# Patient Record
Sex: FEMALE | Race: WHITE | Employment: OTHER | URBAN - METROPOLITAN AREA
[De-identification: names, ages, dates, MRNs, and addresses within clinical notes are randomized per-mention and may not be internally consistent; named-entity substitution may affect disease eponyms.]

---

## 2019-05-07 ENCOUNTER — HOSPITAL ENCOUNTER (OUTPATIENT)
Dept: ULTRASOUND IMAGING | Age: 69
Discharge: HOME | End: 2019-05-07
Attending: PHYSICIAN ASSISTANT
Payer: MEDICARE

## 2019-05-07 DIAGNOSIS — R10.13 EPIGASTRIC PAIN: ICD-10-CM

## 2019-05-07 DIAGNOSIS — R11.0 NAUSEA: ICD-10-CM

## 2019-05-07 DIAGNOSIS — R10.11 RUQ PAIN: ICD-10-CM

## 2019-05-07 PROCEDURE — 76705 ECHO EXAM OF ABDOMEN: CPT

## 2019-07-03 ENCOUNTER — HOSPITAL ENCOUNTER (OUTPATIENT)
Dept: SURGERY | Age: 69
Discharge: HOME | End: 2019-07-03

## 2019-07-08 VITALS — WEIGHT: 136 LBS | BODY MASS INDEX: 25.03 KG/M2 | HEIGHT: 62 IN

## 2019-07-08 NOTE — PERIOP NOTES
Patient verified name, , and procedure. Type: 1a; abbreviated assessment per anesthesia guidelines  Labs per surgeon: none  Labs per anesthesia: none    Instructed pt that they will be notified via office/GI LAB for time of arrival to GI lab. Follow diet and prep instructions per office including NPO status. If patient has NOT received instructions from office patient is advised to call surgeon office, verbalizes understanding. Bath or shower the night before and the am of surgery with non-mositurizing soap. No lotions, oils, powders, cologne on skin. No make up, eye make up or jewelry. Wear loose fitting comfortable, clean clothing. Must have adult present in building the entire time . Medications for the day of procedure- prilosec, zantac, patient to hold- vitamins. The following discharge instructions reviewed with patient: medication given during procedure may cause drowsiness for several hours, therefore, do not drive or operate machinery for remainder of the day. You may not drink alcohol on the day of your procedure, please resume regular diet and activity unless otherwise directed. You may experience abdominal distention for several hours that is relieved by the passage of gas. Contact your physician if you have any of the following: fever or chills, severe abdominal pain or excessive amount of bleeding or a large amount when having a bowel movement.  Occasional specks of blood with bowel movement would not be unusual.

## 2019-07-09 ENCOUNTER — ANESTHESIA EVENT (OUTPATIENT)
Dept: ENDOSCOPY | Age: 69
End: 2019-07-09
Payer: MEDICARE

## 2019-07-10 ENCOUNTER — HOSPITAL ENCOUNTER (OUTPATIENT)
Age: 69
Setting detail: OUTPATIENT SURGERY
Discharge: HOME | End: 2019-07-10
Attending: SURGERY | Admitting: SURGERY
Payer: MEDICARE

## 2019-07-10 ENCOUNTER — ANESTHESIA (OUTPATIENT)
Dept: ENDOSCOPY | Age: 69
End: 2019-07-10
Payer: MEDICARE

## 2019-07-10 VITALS
TEMPERATURE: 96.8 F | BODY MASS INDEX: 23.52 KG/M2 | HEIGHT: 62 IN | DIASTOLIC BLOOD PRESSURE: 62 MMHG | HEART RATE: 63 BPM | WEIGHT: 127.8 LBS | SYSTOLIC BLOOD PRESSURE: 114 MMHG | RESPIRATION RATE: 18 BRPM | OXYGEN SATURATION: 96 %

## 2019-07-10 PROCEDURE — 88312 SPECIAL STAINS GROUP 1: CPT

## 2019-07-10 PROCEDURE — 76060000032 HC ANESTHESIA 0.5 TO 1 HR: Performed by: SURGERY

## 2019-07-10 PROCEDURE — 74011250636 HC RX REV CODE- 250/636

## 2019-07-10 PROCEDURE — 88305 TISSUE EXAM BY PATHOLOGIST: CPT

## 2019-07-10 PROCEDURE — 76040000025: Performed by: SURGERY

## 2019-07-10 PROCEDURE — 77030009426 HC FCPS BIOP ENDOSC BSC -B: Performed by: SURGERY

## 2019-07-10 PROCEDURE — 74011250636 HC RX REV CODE- 250/636: Performed by: ANESTHESIOLOGY

## 2019-07-10 RX ORDER — LIDOCAINE HYDROCHLORIDE 20 MG/ML
INJECTION, SOLUTION EPIDURAL; INFILTRATION; INTRACAUDAL; PERINEURAL AS NEEDED
Status: DISCONTINUED | OUTPATIENT
Start: 2019-07-10 | End: 2019-07-10 | Stop reason: HOSPADM

## 2019-07-10 RX ORDER — PROPOFOL 10 MG/ML
INJECTION, EMULSION INTRAVENOUS AS NEEDED
Status: DISCONTINUED | OUTPATIENT
Start: 2019-07-10 | End: 2019-07-10 | Stop reason: HOSPADM

## 2019-07-10 RX ORDER — PROPOFOL 10 MG/ML
INJECTION, EMULSION INTRAVENOUS
Status: DISCONTINUED | OUTPATIENT
Start: 2019-07-10 | End: 2019-07-10 | Stop reason: HOSPADM

## 2019-07-10 RX ORDER — SUCRALFATE 1 G/1
1 TABLET ORAL 4 TIMES DAILY
Qty: 120 TAB | Refills: 3 | Status: SHIPPED | OUTPATIENT
Start: 2019-07-10 | End: 2019-07-10 | Stop reason: SDUPTHER

## 2019-07-10 RX ORDER — SODIUM CHLORIDE, SODIUM LACTATE, POTASSIUM CHLORIDE, CALCIUM CHLORIDE 600; 310; 30; 20 MG/100ML; MG/100ML; MG/100ML; MG/100ML
100 INJECTION, SOLUTION INTRAVENOUS CONTINUOUS
Status: DISCONTINUED | OUTPATIENT
Start: 2019-07-10 | End: 2019-07-10 | Stop reason: HOSPADM

## 2019-07-10 RX ADMIN — PROPOFOL 50 MG: 10 INJECTION, EMULSION INTRAVENOUS at 10:11

## 2019-07-10 RX ADMIN — SODIUM CHLORIDE, SODIUM LACTATE, POTASSIUM CHLORIDE, AND CALCIUM CHLORIDE 100 ML/HR: 600; 310; 30; 20 INJECTION, SOLUTION INTRAVENOUS at 09:50

## 2019-07-10 RX ADMIN — PROPOFOL 300 MCG/KG/MIN: 10 INJECTION, EMULSION INTRAVENOUS at 10:11

## 2019-07-10 RX ADMIN — LIDOCAINE HYDROCHLORIDE 40 MG: 20 INJECTION, SOLUTION EPIDURAL; INFILTRATION; INTRACAUDAL; PERINEURAL at 10:11

## 2019-07-10 NOTE — ANESTHESIA PREPROCEDURE EVALUATION
Relevant Problems   No relevant active problems       Anesthetic History   No history of anesthetic complications            Review of Systems / Medical History  Patient summary reviewed and pertinent labs reviewed    Pulmonary          Smoker         Neuro/Psych   Within defined limits           Cardiovascular  Within defined limits                Exercise tolerance: >4 METS     GI/Hepatic/Renal     GERD: well controlled           Endo/Other  Within defined limits           Other Findings              Physical Exam    Airway             Cardiovascular  Regular rate and rhythm,  S1 and S2 normal,  no murmur, click, rub, or gallop             Dental         Pulmonary  Breath sounds clear to auscultation               Abdominal         Other Findings            Anesthetic Plan    ASA: 2  Anesthesia type: total IV anesthesia          Induction: Intravenous  Anesthetic plan and risks discussed with: Patient

## 2019-07-10 NOTE — DISCHARGE INSTRUCTIONS
Jeimy Castillo M.D.  [phone]    Instructions following colonoscopy:    ACTIVITY:   Resume usual, basic activities around the house today.  You may be light-headed or sleepy from anesthesia, so be careful going up and down stairs.  Avoid driving, operating machinery, or signing documents for 24 hours. DIET:   No restriction. Please note, some people may have nausea or cramps after this procedure which can result in an upset stomach after eating.  Many people have loose stools or diarrhea immediately after colonoscopy. It is also not uncommon to not have a bowel movement for 2-3 days. PAIN:   Some cramping or gas pain is normal after colonoscopy. However, if you experience worsening pain over the course of the day, or pain with associated fever please call the office immediately      [id number] IF:   You have a temperature higher than 101.5° Fahrenheit for more than 6 hours.  You have severe nausea or vomiting not relieved by medication; or diarrhea. If you take a blood thinning medicine resume it:    Otherwise, continue home medications as previously prescribed. Gastrointestinal Esophagogastroduodenoscopy (EGD) - Upper Exam Discharge Instructions    1. Call Dr. Bobby Prasad at office for any problems or questions. 2. Contact the doctor's office for follow up appointment as directed. 3. Medication may cause drowsiness for several hours, therefore:  · Do not drive or operate machinery for remainder of the day. · No alcohol today. · Do not make any important or legal decisions for 24 hours. · Do not sign any legal documents for 24 hours. 5. Ordinarily, you may resume regular diet and activity after exam unless otherwise specified by your physician. 6. For mild soreness in your throat you may use Cepacol throat lozenges or warm salt-water gargles as needed.     Any additional instructions:  ***     Instructions given to Kareen Greer and other family members.

## 2019-07-10 NOTE — PROCEDURES
Esophagogastroduodenoscopy Procedure Note      Patient: Lashanda Schafer MRN: [id number]     YOB: 1950  Age: 68 y.o. Sex: female           Procedure in Detail:  Informed consent was obtained for the procedure, the patient was brought to the endoscopy suite and sedation was induced by anesthesia. The SPRH214 gastroscope was inserted into the mouth and advanced under direct vision to second portion of the duodenum. A careful inspection was made as the gastroscope was withdrawn, including a retroflexed view of the proximal stomach; findings and interventions are described below, with appropriate photodocumentation obtained. Findings:   Oropharynx:  Normal  Esophagus:  Normal     - nominally irregular EG mucosal transition. However, very large hiatal hernia noted with Z-line approximately 15cm above apparent diagphragmatic hiatus  Cardia of the stomach:  Normal  Body of the stomach:    - Flat lesions:     - mild friability, with mild to moderate biliary  gastritis  Fundus of the stomach:    - Flat lesions:     - mild gastritis  Antrum of the stomach:    - Flat lesions:     - moderate gastritis  Duodenum:  Normal    Therapies:    biopsy of stomach pre pyloric antrum    EBL-  minimal    Specimens: Specimens were collected and sent to pathology. Complications:   None; patient tolerated the procedure well. Recommendations:  - Continue acid suppression.  - Await pathology.   - trial of carafate, consider questran.     -no plan for cholecystectomy at this time    Signed by: Joaquín Ponce MD                         July 10, 2019

## 2019-07-10 NOTE — H&P
New Patient         Patient is a 68 y.o. female who presents for consideration of cholecystectomy. She reports symptoms began in February when she had pain from a Friday night at 10pm to about Saturday at 5pm.  It took a full 3-4 days for her to begin to feel well from the nausea. She attributed that episode to a GI bug as she had been around people with typical viral symptoms. In April she began having episodes of feeling \"gassy\" in her chest with associated RUQ pain. She recalls one particularly severe episode while at the beach which occurred shortly after eating fried shrimp and crabcakes. This was not associated with nausea,vomiting, diarrhea, bloating or fever. However, since that episode she has been bothered at least daily with discomfort. She does not note any specific food triggers. She did not get significant relief from pepcid OTC or zantac OTC. She began to restrict her diet to bland foods such as yogurt, potatoes, and grits. She finally presented to her PCP and underwent testing but also was started on prilosec. She reports that within 2 days she was completely better. She had not had any pain in the nearly 2 weeks between then and her office visit, and has had only occasional abdominal pains since then and today.     Also for initial colonoscopy. Had abdominal pain, severe, epigastric, after drinking mag citrate given due to ineffective miralax.     Medications:        Current Outpatient Medications   Medication Sig    Biotin 2,500 mcg cap Take  by mouth. Indications: patient takes med every other day    raNITIdine (ZANTAC) 150 mg tablet Take 150 mg by mouth daily. Indications: heartburn    omeprazole (PRILOSEC) 20 mg capsule 1 capsule every morning, 30 minutes prior to breakfast      No current facility-administered medications for this visit.          Allergies:          Allergies   Allergen Reactions    Codeine Other (comments)       Stomach ache    Doxycycline Other (comments)       Stomach aches    Penicillins Rash         Past History:  History reviewed. No pertinent past medical history. History reviewed. No pertinent surgical history.      Family and Social History:        Family History   Problem Relation Age of Onset   [place] Cancer Mother      COPD Mother      No Known Problems Father        Social History            Socioeconomic History    Marital status:        Spouse name: Not on file    Number of children: Not on file    Years of education: Not on file    Highest education level: Not on file   Occupational History    Not on file   Social Needs    Financial resource strain: Not on file    Food insecurity:       Worry: Not on file       Inability: Not on file    Transportation needs:       Medical: Not on file       Non-medical: Not on file   Tobacco Use    Smoking status: Current Every Day Smoker       Packs/day: 0.50    Smokeless tobacco: Never Used   Substance and Sexual Activity    Alcohol use: Yes       Comment: occ    Drug use: Never    Sexual activity: Not Currently   Lifestyle    Physical activity:       Days per week: Not on file       Minutes per session: Not on file    Stress: Not on file   Relationships    Social connections:       Talks on phone: Not on file       Gets together: Not on file       Attends Restorationism service: Not on file       Active member of club or organization: Not on file       Attends meetings of clubs or organizations: Not on file       Relationship status: Not on file    Intimate partner violence:       Fear of current or ex partner: Not on file       Emotionally abused: Not on file       Physically abused: Not on file       Forced sexual activity: Not on file   Other Topics Concern    Not on file   Social History Narrative    Not on file       Review of Systems   Constitutional: Negative. HENT: Positive for hearing loss. Eyes: Negative. Respiratory: Negative. Cardiovascular: Negative.     Gastrointestinal: Negative. Genitourinary: Negative. Musculoskeletal: Negative. Skin: Negative. Neurological: Negative. Endo/Heme/Allergies: Bruises/bleeds easily. Psychiatric/Behavioral: Negative.          Physical Exam   Constitutional: She is oriented to person, place, and time. She appears well-developed and well-nourished. She is cooperative. Non-toxic appearance. HENT:   Head: Normocephalic and atraumatic. Mouth/Throat: Oropharynx is clear and moist.   Eyes: Pupils are equal, round, and reactive to light. Conjunctivae and EOM are normal. No scleral icterus. Neck: Normal range of motion. No JVD present. No tracheal deviation present. No thyromegaly present. Cardiovascular: Normal rate, regular rhythm and normal heart sounds. Exam reveals no gallop and no friction rub. No murmur heard. Pulmonary/Chest: Effort normal and breath sounds normal. No respiratory distress. She has no wheezes. She has no rales. Abdominal: Soft. Bowel sounds are normal. She exhibits no distension. There is no tenderness. There is no rebound and no guarding. Musculoskeletal:   No gross deformities   Neurological: She is alert and oriented to person, place, and time. No cranial nerve deficit. Skin: Skin is warm. She is not diaphoretic. Psychiatric: She has a normal mood and affect. Her behavior is normal. Judgment and thought content normal.   Vitals reviewed.     DATA:  US Results (most recent):  Results from [place] encounter on 05/07/19   US ABD LTD     Narrative ABDOMINAL ULTRASOUND     HISTORY: Right upper quadrant pain and epigastric pain for 2 months.     TECHNIQUE: Sonographic imaging of the right upper quadrant was performed.     COMPARISON: None     FINDINGS: The pancreatic head is grossly normal in appearance.     The liver parenchyma is homogeneous in echotexture without focal masses or  intrahepatic biliary ductal dilatation. Hepatopedal flow is present in the main  portal vein.  The common bile duct is 6 mm in diameter at the hilum of the liver  and distended up to 2 cm more distally where it appears to contain an echogenic  shadowing stone. Shadowing intraluminal gallstones are present. There is no  pericholecystic fluid. The gallbladder wall is mildly thickened up to 3.4 mm. The sonographic Lanier's sign is absent.     The right kidney is 10.4 cm in length. There is no hydronephrosis. There is 1.2  cm upper pole simple cyst in the right kidney.     The distal abdominal aorta is distended up to 1.9 cm in diameter. The IVC is  patent.        Impression IMPRESSION: Cholelithiasis with probable choledocholithiasis. Mild gallbladder  wall thickening. Consider surgical evaluation and if indicated a nuclear  medicine hepatobiliary scan for further assessment.        Results were phoned by the radiology navigator to the referring physician's  office. [id number]                  Lab Results   Component Value Date/Time     ALT (SGPT) 12 04/30/2019 11:10 AM     AST (SGOT) 15 04/30/2019 11:10 AM     Alk. phosphatase 149 (H) 04/30/2019 11:10 AM     Bilirubin, total 0.4 04/30/2019 11:10 AM      ASSESSMENT and PLAN       Encounter Diagnoses   Name Primary?  Gallstones Yes    Upper abdominal pain      Family history of colon cancer requiring screening colonoscopy        Uncertain based on her history if symptoms are gallstone related, which is more likely from her description, or gastritis related based on her apparent symptom resolution with PPI initiation. She has not had significant symptoms since office visit 5/6, but does have occasional epigastric and right mid/lower abdominal twinges after eating. Will proceed with EGD and Colonoscopy. Suspect symptoms likely gallbladder in origin, need to r/o smoldering gastritis.

## 2019-07-10 NOTE — PROCEDURES
Procedure in Detail:  Informed consent was obtained for the procedure. The patient was placed in the left lateral decubitus position and sedation was induced by anesthesia. The scope was inserted into the rectum and advanced under direct vision to the terminal ileum. The quality of the colonic preparation was good. A careful inspection was made as the colonoscope was withdrawn, including a retroflexed view of the rectum; findings and interventions are described below. Appropriate photodocumentation was obtained. Findings:   ANUS: Anal exam reveals no masses or hemorrhoids, sphincter tone is normal.   RECTUM: Rectal exam reveals no masses or hemorrhoids. SIGMOID COLON: The mucosa is normal with good vascular pattern and without ulcers, or polyps. Mild to moderate diverticulosis noted. DESCENDING COLON: The mucosa is normal with good vascular pattern and without ulcers, diverticula, and polyps. SPLENIC FLEXURE: The splenic flexure is normal.   TRANSVERSE COLON: The mucosa is normal with good vascular pattern and without ulcers, diverticula, and polyps. HEPATIC FLEXURE: The hepatic flexure is normal.   ASCENDING COLON: The mucosa is normal with good vascular pattern and without ulcers, diverticula, and polyps. CECUM: The appendiceal orifice appears normal. The ileocecal valve appears normal.   TERMINAL ILEUM: The terminal ileum was normal.     Specimens: No specimens were collected. Complications: None; patient tolerated the procedure well. \    EBL - none    Recommendations:   - For colon cancer screening in this average-risk patient, colonoscopy may be repeated in 10 years.      Signed By: Elijah Vivas MD                        July 10, 2019

## 2019-07-10 NOTE — ANESTHESIA POSTPROCEDURE EVALUATION
Procedure(s):  COLONOSCOPY  ESOPHAGOGASTRODUODENOSCOPY (EGD)/ BMI 23  ESOPHAGOGASTRODUODENAL (EGD) BIOPSY.     total IV anesthesia    Anesthesia Post Evaluation      Multimodal analgesia: multimodal analgesia used between 6 hours prior to anesthesia start to PACU discharge  Patient location during evaluation: bedside  Patient participation: complete - patient participated  Level of consciousness: awake and alert  Pain score: 3  Pain management: adequate  Airway patency: patent  Anesthetic complications: no  Cardiovascular status: acceptable and hemodynamically stable  Respiratory status: acceptable  Hydration status: acceptable  Post anesthesia nausea and vomiting:  none      Vitals Value Taken Time   BP 75/51 7/10/2019 10:44 AM   Temp 36 °C (96.8 °F) 7/10/2019 10:44 AM   Pulse 75 7/10/2019 10:44 AM   Resp 16 7/10/2019 10:44 AM   SpO2 88 % 7/10/2019 10:44 AM

## 2019-07-18 NOTE — PROGRESS NOTES
Patient informed of results. Patient reports constipation and itching since beginning carafate. Will review with Isabelle Aquino on Tuesday and get back with patient.

## 2019-08-05 ENCOUNTER — APPOINTMENT (OUTPATIENT)
Dept: ULTRASOUND IMAGING | Age: 69
DRG: 446 | End: 2019-08-05
Attending: NURSE PRACTITIONER
Payer: MEDICARE

## 2019-08-05 ENCOUNTER — HOSPITAL ENCOUNTER (EMERGENCY)
Age: 69
Discharge: HOME | DRG: 446 | End: 2019-08-05
Attending: EMERGENCY MEDICINE
Payer: MEDICARE

## 2019-08-05 ENCOUNTER — ANESTHESIA EVENT (OUTPATIENT)
Dept: ENDOSCOPY | Age: 69
DRG: 446 | End: 2019-08-05
Payer: MEDICARE

## 2019-08-05 ENCOUNTER — ANESTHESIA (OUTPATIENT)
Dept: ENDOSCOPY | Age: 69
DRG: 446 | End: 2019-08-05
Payer: MEDICARE

## 2019-08-05 ENCOUNTER — HOSPITAL ENCOUNTER (INPATIENT)
Age: 69
LOS: 2 days | Discharge: HOME | DRG: 446 | End: 2019-08-07
Attending: INTERNAL MEDICINE | Admitting: INTERNAL MEDICINE
Payer: MEDICARE

## 2019-08-05 VITALS
OXYGEN SATURATION: 92 % | HEART RATE: 45 BPM | TEMPERATURE: 97.6 F | BODY MASS INDEX: 24.84 KG/M2 | HEIGHT: 62 IN | SYSTOLIC BLOOD PRESSURE: 163 MMHG | RESPIRATION RATE: 18 BRPM | DIASTOLIC BLOOD PRESSURE: 80 MMHG | WEIGHT: 135 LBS

## 2019-08-05 DIAGNOSIS — K80.50 GALL STONES, COMMON BILE DUCT: Primary | ICD-10-CM

## 2019-08-05 PROBLEM — K21.9 GERD (GASTROESOPHAGEAL REFLUX DISEASE): Status: ACTIVE | Noted: 2019-08-05

## 2019-08-05 LAB
ALBUMIN SERPL-MCNC: 4.2 G/DL (ref 3.2–4.6)
ALBUMIN/GLOB SERPL: 1 {RATIO} (ref 1.2–3.5)
ALP SERPL-CCNC: 575 U/L (ref 50–130)
ALT SERPL-CCNC: 574 U/L (ref 12–65)
ANION GAP SERPL CALC-SCNC: 10 MMOL/L (ref 7–16)
AST SERPL-CCNC: 297 U/L (ref 15–37)
BASOPHILS # BLD: 0 K/UL (ref 0–0.2)
BASOPHILS NFR BLD: 0 % (ref 0–2)
BILIRUB SERPL-MCNC: 7.7 MG/DL (ref 0.2–1.1)
BUN SERPL-MCNC: 14 MG/DL (ref 8–23)
CALCIUM SERPL-MCNC: 10.3 MG/DL (ref 8.3–10.4)
CHLORIDE SERPL-SCNC: 105 MMOL/L (ref 98–107)
CO2 SERPL-SCNC: 27 MMOL/L (ref 21–32)
CREAT SERPL-MCNC: 0.92 MG/DL (ref 0.6–1)
DIFFERENTIAL METHOD BLD: ABNORMAL
EOSINOPHIL # BLD: 0 K/UL (ref 0–0.8)
EOSINOPHIL NFR BLD: 0 % (ref 0.5–7.8)
ERYTHROCYTE [DISTWIDTH] IN BLOOD BY AUTOMATED COUNT: 14.1 % (ref 11.9–14.6)
GLOBULIN SER CALC-MCNC: 4.2 G/DL (ref 2.3–3.5)
GLUCOSE SERPL-MCNC: 133 MG/DL (ref 65–100)
HCT VFR BLD AUTO: 49.5 % (ref 35.8–46.3)
HGB BLD-MCNC: 16.5 G/DL (ref 11.7–15.4)
IMM GRANULOCYTES # BLD AUTO: 0.1 K/UL (ref 0–0.5)
IMM GRANULOCYTES NFR BLD AUTO: 0 % (ref 0–5)
LIPASE SERPL-CCNC: 224 U/L (ref 73–393)
LYMPHOCYTES # BLD: 0.9 K/UL (ref 0.5–4.6)
LYMPHOCYTES NFR BLD: 7 % (ref 13–44)
MCH RBC QN AUTO: 28.4 PG (ref 26.1–32.9)
MCHC RBC AUTO-ENTMCNC: 33.3 G/DL (ref 31.4–35)
MCV RBC AUTO: 85.2 FL (ref 79.6–97.8)
MONOCYTES # BLD: 1 K/UL (ref 0.1–1.3)
MONOCYTES NFR BLD: 8 % (ref 4–12)
NEUTS SEG # BLD: 10.7 K/UL (ref 1.7–8.2)
NEUTS SEG NFR BLD: 85 % (ref 43–78)
NRBC # BLD: 0 K/UL (ref 0–0.2)
PLATELET # BLD AUTO: 395 K/UL (ref 150–450)
PMV BLD AUTO: 10.1 FL (ref 9.4–12.3)
POTASSIUM SERPL-SCNC: 3.8 MMOL/L (ref 3.5–5.1)
PROT SERPL-MCNC: 8.4 G/DL (ref 6.3–8.2)
RBC # BLD AUTO: 5.81 M/UL (ref 4.05–5.2)
SODIUM SERPL-SCNC: 142 MMOL/L (ref 136–145)
TROPONIN I SERPL-MCNC: <0.02 NG/ML (ref 0.02–0.05)
WBC # BLD AUTO: 12.7 K/UL (ref 4.3–11.1)

## 2019-08-05 PROCEDURE — 77030033269 HC SLV COMPR SCD KNE2 CARD -B

## 2019-08-05 PROCEDURE — 96376 TX/PRO/DX INJ SAME DRUG ADON: CPT | Performed by: NURSE PRACTITIONER

## 2019-08-05 PROCEDURE — 65270000029 HC RM PRIVATE

## 2019-08-05 PROCEDURE — 96365 THER/PROPH/DIAG IV INF INIT: CPT | Performed by: NURSE PRACTITIONER

## 2019-08-05 PROCEDURE — 83690 ASSAY OF LIPASE: CPT

## 2019-08-05 PROCEDURE — 74011000258 HC RX REV CODE- 258: Performed by: NURSE PRACTITIONER

## 2019-08-05 PROCEDURE — 96375 TX/PRO/DX INJ NEW DRUG ADDON: CPT | Performed by: NURSE PRACTITIONER

## 2019-08-05 PROCEDURE — 84484 ASSAY OF TROPONIN QUANT: CPT

## 2019-08-05 PROCEDURE — 77030020263 HC SOL INJ SOD CL0.9% LFCR 1000ML

## 2019-08-05 PROCEDURE — 74011250636 HC RX REV CODE- 250/636: Performed by: INTERNAL MEDICINE

## 2019-08-05 PROCEDURE — 74011250636 HC RX REV CODE- 250/636: Performed by: NURSE PRACTITIONER

## 2019-08-05 PROCEDURE — 99285 EMERGENCY DEPT VISIT HI MDM: CPT | Performed by: NURSE PRACTITIONER

## 2019-08-05 PROCEDURE — 93005 ELECTROCARDIOGRAM TRACING: CPT | Performed by: NURSE PRACTITIONER

## 2019-08-05 PROCEDURE — 80053 COMPREHEN METABOLIC PANEL: CPT

## 2019-08-05 PROCEDURE — 74011000258 HC RX REV CODE- 258: Performed by: INTERNAL MEDICINE

## 2019-08-05 PROCEDURE — 76705 ECHO EXAM OF ABDOMEN: CPT

## 2019-08-05 PROCEDURE — 85025 COMPLETE CBC W/AUTO DIFF WBC: CPT

## 2019-08-05 RX ORDER — METRONIDAZOLE 500 MG/100ML
500 INJECTION, SOLUTION INTRAVENOUS EVERY 8 HOURS
Status: DISCONTINUED | OUTPATIENT
Start: 2019-08-05 | End: 2019-08-05

## 2019-08-05 RX ORDER — ONDANSETRON 2 MG/ML
4 INJECTION INTRAMUSCULAR; INTRAVENOUS
Status: DISCONTINUED | OUTPATIENT
Start: 2019-08-05 | End: 2019-08-07 | Stop reason: HOSPADM

## 2019-08-05 RX ORDER — MORPHINE SULFATE 10 MG/ML
4 INJECTION, SOLUTION INTRAMUSCULAR; INTRAVENOUS
Status: COMPLETED | OUTPATIENT
Start: 2019-08-05 | End: 2019-08-05

## 2019-08-05 RX ORDER — MORPHINE SULFATE 10 MG/ML
4 INJECTION, SOLUTION INTRAMUSCULAR; INTRAVENOUS
Status: DISCONTINUED | OUTPATIENT
Start: 2019-08-05 | End: 2019-08-05

## 2019-08-05 RX ORDER — SODIUM CHLORIDE 9 MG/ML
125 INJECTION, SOLUTION INTRAVENOUS CONTINUOUS
Status: DISCONTINUED | OUTPATIENT
Start: 2019-08-05 | End: 2019-08-07 | Stop reason: HOSPADM

## 2019-08-05 RX ORDER — SODIUM CHLORIDE 9 MG/ML
125 INJECTION, SOLUTION INTRAVENOUS ONCE
Status: COMPLETED | OUTPATIENT
Start: 2019-08-05 | End: 2019-08-05

## 2019-08-05 RX ORDER — SODIUM CHLORIDE 0.9 % (FLUSH) 0.9 %
5-40 SYRINGE (ML) INJECTION AS NEEDED
Status: DISCONTINUED | OUTPATIENT
Start: 2019-08-05 | End: 2019-08-07 | Stop reason: HOSPADM

## 2019-08-05 RX ORDER — ONDANSETRON 2 MG/ML
4 INJECTION INTRAMUSCULAR; INTRAVENOUS
Status: COMPLETED | OUTPATIENT
Start: 2019-08-05 | End: 2019-08-05

## 2019-08-05 RX ORDER — SODIUM CHLORIDE 0.9 % (FLUSH) 0.9 %
5-40 SYRINGE (ML) INJECTION EVERY 8 HOURS
Status: DISCONTINUED | OUTPATIENT
Start: 2019-08-05 | End: 2019-08-07 | Stop reason: HOSPADM

## 2019-08-05 RX ORDER — HYDROMORPHONE HYDROCHLORIDE 2 MG/ML
0.5 INJECTION, SOLUTION INTRAMUSCULAR; INTRAVENOUS; SUBCUTANEOUS
Status: COMPLETED | OUTPATIENT
Start: 2019-08-05 | End: 2019-08-05

## 2019-08-05 RX ORDER — HYDROMORPHONE HYDROCHLORIDE 1 MG/ML
0.5 INJECTION, SOLUTION INTRAMUSCULAR; INTRAVENOUS; SUBCUTANEOUS
Status: DISCONTINUED | OUTPATIENT
Start: 2019-08-05 | End: 2019-08-07 | Stop reason: HOSPADM

## 2019-08-05 RX ADMIN — SODIUM CHLORIDE 125 ML/HR: 900 INJECTION, SOLUTION INTRAVENOUS at 16:57

## 2019-08-05 RX ADMIN — ONDANSETRON 4 MG: 2 INJECTION INTRAMUSCULAR; INTRAVENOUS at 14:33

## 2019-08-05 RX ADMIN — Medication 10 ML: at 16:58

## 2019-08-05 RX ADMIN — HYDROMORPHONE HYDROCHLORIDE 0.5 MG: 1 INJECTION, SOLUTION INTRAMUSCULAR; INTRAVENOUS; SUBCUTANEOUS at 21:50

## 2019-08-05 RX ADMIN — MORPHINE SULFATE 4 MG: 10 INJECTION, SOLUTION INTRAMUSCULAR; INTRAVENOUS at 11:31

## 2019-08-05 RX ADMIN — HYDROMORPHONE HYDROCHLORIDE 0.5 MG: 2 INJECTION INTRAMUSCULAR; INTRAVENOUS; SUBCUTANEOUS at 13:15

## 2019-08-05 RX ADMIN — HYDROMORPHONE HYDROCHLORIDE 0.5 MG: 1 INJECTION, SOLUTION INTRAMUSCULAR; INTRAVENOUS; SUBCUTANEOUS at 17:00

## 2019-08-05 RX ADMIN — ONDANSETRON 4 MG: 2 INJECTION INTRAMUSCULAR; INTRAVENOUS at 11:31

## 2019-08-05 RX ADMIN — SODIUM CHLORIDE 125 ML/HR: 900 INJECTION, SOLUTION INTRAVENOUS at 14:13

## 2019-08-05 RX ADMIN — ONDANSETRON 4 MG: 2 INJECTION INTRAMUSCULAR; INTRAVENOUS at 21:52

## 2019-08-05 RX ADMIN — SODIUM CHLORIDE 1000 ML: 900 INJECTION, SOLUTION INTRAVENOUS at 11:32

## 2019-08-05 RX ADMIN — CEFTRIAXONE 1 G: 1 INJECTION, POWDER, FOR SOLUTION INTRAMUSCULAR; INTRAVENOUS at 14:14

## 2019-08-05 RX ADMIN — ONDANSETRON 4 MG: 2 INJECTION INTRAMUSCULAR; INTRAVENOUS at 16:59

## 2019-08-05 RX ADMIN — Medication 10 ML: at 21:52

## 2019-08-05 RX ADMIN — MEROPENEM 500 MG: 500 INJECTION INTRAVENOUS at 17:40

## 2019-08-05 NOTE — ED NOTES
TRANSFER - OUT REPORT:    Verbal report given to BLANQUITA Ordonez(name) on Marisel Beltran  being transferred to [address] [facility](unit) for routine progression of care       Report consisted of patients Situation, Background, Assessment and   Recommendations(SBAR). Information from the following report(s) SBAR was reviewed with the receiving nurse. Lines:   Peripheral IV 08/05/19 Left Antecubital (Active)   Site Assessment Clean, dry, & intact 8/5/2019 10:55 AM   Phlebitis Assessment 0 8/5/2019 10:55 AM   Infiltration Assessment 0 8/5/2019 10:55 AM   Dressing Status Clean, dry, & intact 8/5/2019 10:55 AM   Hub Color/Line Status Green 8/5/2019 10:55 AM        Opportunity for questions and clarification was provided.       Patient transported with:   [employer] via Lucy Saavedra

## 2019-08-05 NOTE — PROGRESS NOTES
TRANSFER - IN REPORT:    Verbal report received from Boubacar(name) on Mariza Medrano  being received from [facility] ER(unit) for routine progression of care      Report consisted of patients Situation, Background, Assessment and   Recommendations(SBAR). Information from the following report(s) SBAR and [employer] was reviewed with the receiving nurse. Opportunity for questions and clarification was provided. Assessment completed upon patients arrival to unit and care assumed. Pt to be admitted to room 634 but I asked for her to be transported to the gi lab instead of 634.

## 2019-08-05 NOTE — PROGRESS NOTES
Went to [employer] flagyl, pt said it causes severe itching/ refused, DR Duggan notified, added to allergies. Pt does not have diet ordered, pt said she is feeling nauseated and does not want anything to eat or drink, NPO, okay to do clears per DR if pt asks for anything up until midnight tonight, keep NPO per DR. Pain medication and nausea medication given per MAR. Will continue to monitor pt and give report to oncoming nurse.

## 2019-08-05 NOTE — ED PROVIDER NOTES
Patient presents epigastric pain, nausea, vomiting, and back pain that started yesterday morning. She states she has a known history of gallstones. The history is provided by the patient.         Past Medical History:   Diagnosis Date    GERD (gastroesophageal reflux disease)     Smoker     1 ppd x 50 years       Past Surgical History:   Procedure Laterality Date    COLONOSCOPY N/A 7/10/2019    COLONOSCOPY performed by Amanda Bullard MD at [address] [place] [address] FLX DX W/COLLJ SPEC WHEN PFRMD  7/10/2019         ME EGD TRANSORAL BIOPSY SINGLE/MULTIPLE  7/10/2019              Family History:   Problem Relation Age of Onset    Cancer Mother     COPD Mother     No Known Problems Father        Social History     Socioeconomic History    Marital status:      Spouse name: Not on file    Number of children: Not on file    Years of education: Not on file    Highest education level: Not on file   Occupational History    Not on file   Social Needs    Financial resource strain: Not on file    Food insecurity:     Worry: Not on file     Inability: Not on file    Transportation needs:     Medical: Not on file     Non-medical: Not on file   Tobacco Use    Smoking status: Current Every Day Smoker     Packs/day: 1.00     Years: 50.00     Pack years: 50.00    Smokeless tobacco: Never Used   Substance and Sexual Activity    Alcohol use: Not Currently    Drug use: Never    Sexual activity: Not Currently   Lifestyle    Physical activity:     Days per week: Not on file     Minutes per session: Not on file    Stress: Not on file   Relationships    Social connections:     Talks on phone: Not on file     Gets together: Not on file     Attends [employer] service: Not on file     Active member of club or organization: Not on file     Attends meetings of clubs or organizations: Not on file     Relationship status: Not on file    Intimate partner violence:     Fear of current or ex partner: Not on file     Emotionally abused: Not on file     Physically abused: Not on file     Forced sexual activity: Not on file   Other Topics Concern    Not on file   Social History Narrative    Not on file         ALLERGIES: Codeine; Doxycycline; and Penicillins    Review of Systems   Constitutional: Negative for fever. Gastrointestinal: Positive for abdominal pain, nausea and vomiting. Musculoskeletal: Positive for back pain. Vitals:    08/05/19 1037   BP: 160/75   Pulse: (!) 58   Resp: 16   Temp: 97.6 °F (36.4 °C)   SpO2: 94%   Weight: 61.2 kg (135 lb)   Height: 5' 2\" (1.575 m)            Physical Exam   Constitutional: She is oriented to person, place, and time. No distress. HENT:   Head: Normocephalic and atraumatic. Eyes: Conjunctivae and EOM are normal.   Neck: Normal range of motion. Neck supple. Cardiovascular: Normal rate and regular rhythm. Pulmonary/Chest: Effort normal and breath sounds normal.   Abdominal: Normal appearance and bowel sounds are normal. There is tenderness in the right upper quadrant, epigastric area and left upper quadrant. Neurological: She is alert and oriented to person, place, and time. Skin: Skin is warm. She is not diaphoretic. Psychiatric: She has a normal mood and affect. Her behavior is normal.   Nursing note and vitals reviewed.      Recent Results (from the past 12 hour(s))   CBC WITH AUTOMATED DIFF    Collection Time: 08/05/19 10:57 AM   Result Value Ref Range    WBC 12.7 (H) 4.3 - 11.1 K/uL    RBC 5.81 (H) 4.05 - 5.2 M/uL    HGB 16.5 (H) 11.7 - 15.4 g/dL    HCT 49.5 (H) 35.8 - 46.3 %    MCV 85.2 79.6 - 97.8 FL    MCH 28.4 26.1 - 32.9 PG    MCHC 33.3 31.4 - 35.0 g/dL    RDW 14.1 11.9 - 14.6 %    PLATELET 944 052 - 369 K/uL    MPV 10.1 9.4 - 12.3 FL    ABSOLUTE NRBC 0.00 0.0 - 0.2 K/uL    DF AUTOMATED      NEUTROPHILS 85 (H) 43 - 78 %    LYMPHOCYTES 7 (L) 13 - 44 %    MONOCYTES 8 4.0 - 12.0 %    EOSINOPHILS 0 (L) 0.5 - 7.8 %    BASOPHILS 0 0.0 - 2.0 % IMMATURE GRANULOCYTES 0 0.0 - 5.0 %    ABS. NEUTROPHILS 10.7 (H) 1.7 - 8.2 K/UL    ABS. LYMPHOCYTES 0.9 0.5 - 4.6 K/UL    ABS. MONOCYTES 1.0 0.1 - 1.3 K/UL    ABS. EOSINOPHILS 0.0 0.0 - 0.8 K/UL    ABS. BASOPHILS 0.0 0.0 - 0.2 K/UL    ABS. IMM. GRANS. 0.1 0.0 - 0.5 K/UL   METABOLIC PANEL, COMPREHENSIVE    Collection Time: 08/05/19 10:57 AM   Result Value Ref Range    Sodium 142 136 - 145 mmol/L    Potassium 3.8 3.5 - 5.1 mmol/L    Chloride 105 98 - 107 mmol/L    CO2 27 21 - 32 mmol/L    Anion gap 10 7 - 16 mmol/L    Glucose 133 (H) 65 - 100 mg/dL    BUN 14 8 - 23 MG/DL    Creatinine 0.92 0.6 - 1.0 MG/DL    GFR est AA >60 >60 ml/min/1.73m2    GFR est non-AA >60 >60 ml/min/1.73m2    Calcium 10.3 8.3 - 10.4 MG/DL    Bilirubin, total 7.7 (H) 0.2 - 1.1 MG/DL    ALT (SGPT) 574 (H) 12 - 65 U/L    AST (SGOT) 297 (H) 15 - 37 U/L    Alk. phosphatase 575 (H) 50 - 130 U/L    Protein, total 8.4 (H) 6.3 - 8.2 g/dL    Albumin 4.2 3.2 - 4.6 g/dL    Globulin 4.2 (H) 2.3 - 3.5 g/dL    A-G Ratio 1.0 (L) 1.2 - 3.5     LIPASE    Collection Time: 08/05/19 10:57 AM   Result Value Ref Range    Lipase 224 73 - 393 U/L   TROPONIN I    Collection Time: 08/05/19 10:57 AM   Result Value Ref Range    Troponin-I, Qt. <0.02 (L) 0.02 - 0.05 NG/ML     [employer]    Result Date: 8/5/2019  RIGHT UPPER QUADRANT  ULTRASOUND INDICATION: Abdominal pain gallstones COMPARISON: 05/07/2019 Transabdominal imaging of the upper abdomen was performed. FINDINGS: -LIVER: 17 cm. Normal echogenicity. No masses. -BILE DUCTS: Moderate bile duct dilatation. CBD diameter = 17 mm. At least one stone in the common duct. -GALLBLADDER: Cholelithiasis. No significant gallbladder wall thickening. -PANCREAS: Normal. -RIGHT KIDNEY: 10.1 cm.  1 cm upper pole cyst.  No mass or hydronephrosis. -ABDOMINAL AORTA AND IVC: Normal in size. -ASCITES: No free fluid. IMPRESSION: Cholelithiasis and choledocholithiasis. Stable bile duct dilatation.      MDM  Number of Diagnoses or Management Options  Gall stones, common bile duct:   Diagnosis management comments: Discussed patient with Dr. Santa Beckett, Dr. Tee Londono, and Dr. Bharath Gutierrez. Patient will be transferred DT for ERCP. Elevated liver enzymes and WBC. Patient given total of 4mg of IV morphine, 0.5 mg of IV dilaudid, rocephin and NS. Amount and/or Complexity of Data Reviewed  Clinical lab tests: reviewed and ordered  Tests in the radiology section of CPT®: ordered and reviewed  Tests in the medicine section of CPT®: ordered and reviewed  Discuss the patient with other providers: yes Velma Bergman, Bharath Gutierrez)    Risk of Complications, Morbidity, and/or Mortality  Presenting problems: moderate  Diagnostic procedures: moderate  Management options: moderate    Patient Progress  Patient progress: stable    ED Course as of Aug 05 1338   Mon Aug 05, 2019   1123 Discussed patient with Dr. Dian Willams    [JM]   [phone] Patient states nausea has improved but pain remains. [JM]   6368 Discussed patient with Dr. Dian Willams. Surgery paged. [JM]   [phone] with Dr. Bharath Gutierrez and Dr. Tee Londono. Patient to be admitted downtown for ERCP.      [JM]      ED Course User Index  [JM] GEORGE Farris       Procedures

## 2019-08-05 NOTE — ED NOTES
EKG refused at this time, pt states she cannot roll over to have the leads placed, it hurts too much.

## 2019-08-05 NOTE — PROGRESS NOTES
TRANSFER - IN REPORT:    Verbal report received from Sepideh Bragg RN(name) on Teresateen Fatou  being received from ES(unit) for routine progression of care      Report consisted of patients Situation, Background, Assessment and   Recommendations(SBAR). Information from the following report(s) SBAR was reviewed with the receiving nurse. Opportunity for questions and clarification was provided. Assessment completed upon patients arrival to unit and care assumed.

## 2019-08-05 NOTE — ED TRIAGE NOTES
Pt states she has been vomiting since yesterday morning and now having severe cramping in upper abd. States hx of gallstones.

## 2019-08-05 NOTE — H&P
HOSPITALIST H&P/CONSULT  NAME:  Brandy Delgadillo   Age:  68 y.o.  :   1950   MRN:   [id number]  PCP: Francia Wisdom  Consulting MD:  Treatment Team: Attending Provider: Aaron Pereira DO  HPI:   Patient 10R with few pmhx significant only for GERD presents with 24 hours of nausea, vomiting and midepigastric abdominal pain radiating to her back. She reports a history of gallstones. No hematemesis, melena or hematochezia, fever or chills reported. ED workup notable for WBC 12.7K, , , Alk phos 575, lipase 224    US showing cholelithiasis and choledocolithiasis. ED has called GI who has requested transfer DT for ERCP. Complete ROS done and is as stated in HPI or otherwise negative  Past Medical History:   Diagnosis Date    GERD (gastroesophageal reflux disease)     Smoker     1 ppd x 50 years      Past Surgical History:   Procedure Laterality Date    COLONOSCOPY N/A 7/10/2019    COLONOSCOPY performed by Sandeep Gonzalez MD at [address] [place] [address] FLX DX W/COLLJ SPEC WHEN PFRMD  7/10/2019         [place] EGD TRANSORAL BIOPSY SINGLE/MULTIPLE  7/10/2019           Prior to Admission Medications   Prescriptions Last Dose Informant Patient Reported? Taking? Biotin 2,500 mcg cap   Yes No   Sig: Take  by mouth.  Indications: patient takes med every other day   omeprazole (PRILOSEC) 20 mg capsule Unknown at Unknown time  No No   Si capsule bid   sucralfate (CARAFATE) 1 gram tablet Unknown at Unknown time  No No   Sig: TAKE 1 TABLET BY MOUTH FOUR TIMES DAILY      Facility-Administered Medications: None     Allergies   Allergen Reactions    Codeine Other (comments)     Stomach ache    Doxycycline Other (comments)     Stomach aches    Flagyl [Metronidazole] Itching    Penicillins Rash      Social History     Tobacco Use    Smoking status: Current Every Day Smoker     Packs/day: 1.00     Years: 50.00     Pack years: 50.00    Smokeless tobacco: Never Used Substance Use Topics    Alcohol use: Not Currently      Family History   Problem Relation Age of Onset   Susan Dougherty Cancer Mother     COPD Mother     No Known Problems Father       Objective:     Visit Vitals  /62 (BP 1 Location: Right arm, BP Patient Position: At rest)   Pulse (!) 49   Temp 98.2 °F (36.8 °C)   Resp 18   SpO2 93%      Temp (24hrs), Av.9 °F (36.6 °C), Min:97.6 °F (36.4 °C), Max:98.2 °F (36.8 °C)    Oxygen Therapy  O2 Sat (%): 93 % (19 1623)  Physical Exam:  General:    Alert, cooperative, laying on side and reporting abdominal pain      Head:   Normocephalic, without obvious abnormality, atraumatic. Nose:  Nares normal. No drainage or sinus tenderness. Lungs:   Clear to auscultation bilaterally. No Wheezing or Rhonchi. No rales. Heart:   Regular rate and rhythm,  no murmur, rub or gallop. Abdomen:   Soft, minimally ttp in epigastric,   Bowel sounds normal.   Extremities: No cyanosis. No edema. No clubbing  Skin:     Texture, turgor normal. No rashes or lesions. Not Jaundiced  Neurologic: Alert and oriented x 3, no focal deficits   Data Review:   Recent Results (from the past 24 hour(s))   CBC WITH AUTOMATED DIFF    Collection Time: 19 10:57 AM   Result Value Ref Range    WBC 12.7 (H) 4.3 - 11.1 K/uL    RBC 5.81 (H) 4.05 - 5.2 M/uL    HGB 16.5 (H) 11.7 - 15.4 g/dL    HCT 49.5 (H) 35.8 - 46.3 %    MCV 85.2 79.6 - 97.8 FL    MCH 28.4 26.1 - 32.9 PG    MCHC 33.3 31.4 - 35.0 g/dL    RDW 14.1 11.9 - 14.6 %    PLATELET 297 337 - 076 K/uL    MPV 10.1 9.4 - 12.3 FL    ABSOLUTE NRBC 0.00 0.0 - 0.2 K/uL    DF AUTOMATED      NEUTROPHILS 85 (H) 43 - 78 %    LYMPHOCYTES 7 (L) 13 - 44 %    MONOCYTES 8 4.0 - 12.0 %    EOSINOPHILS 0 (L) 0.5 - 7.8 %    BASOPHILS 0 0.0 - 2.0 %    IMMATURE GRANULOCYTES 0 0.0 - 5.0 %    ABS. NEUTROPHILS 10.7 (H) 1.7 - 8.2 K/UL    ABS. LYMPHOCYTES 0.9 0.5 - 4.6 K/UL    ABS. MONOCYTES 1.0 0.1 - 1.3 K/UL    ABS. EOSINOPHILS 0.0 0.0 - 0.8 K/UL    ABS.  BASOPHILS 0.0 0.0 - 0.2 K/UL    ABS. IMM. GRANS. 0.1 0.0 - 0.5 K/UL   METABOLIC PANEL, COMPREHENSIVE    Collection Time: 08/05/19 10:57 AM   Result Value Ref Range    Sodium 142 136 - 145 mmol/L    Potassium 3.8 3.5 - 5.1 mmol/L    Chloride 105 98 - 107 mmol/L    CO2 27 21 - 32 mmol/L    Anion gap 10 7 - 16 mmol/L    Glucose 133 (H) 65 - 100 mg/dL    BUN 14 8 - 23 MG/DL    Creatinine 0.92 0.6 - 1.0 MG/DL    GFR est AA >60 >60 ml/min/1.73m2    GFR est non-AA >60 >60 ml/min/1.73m2    Calcium 10.3 8.3 - 10.4 MG/DL    Bilirubin, total 7.7 (H) 0.2 - 1.1 MG/DL    ALT (SGPT) 574 (H) 12 - 65 U/L    AST (SGOT) 297 (H) 15 - 37 U/L    Alk. phosphatase 575 (H) 50 - 130 U/L    Protein, total 8.4 (H) 6.3 - 8.2 g/dL    Albumin 4.2 3.2 - 4.6 g/dL    Globulin 4.2 (H) 2.3 - 3.5 g/dL    A-G Ratio 1.0 (L) 1.2 - 3.5     LIPASE    Collection Time: 08/05/19 10:57 AM   Result Value Ref Range    Lipase 224 73 - 393 U/L   TROPONIN I    Collection Time: 08/05/19 10:57 AM   Result Value Ref Range    Troponin-I, Qt. <0.02 (L) 0.02 - 0.05 NG/ML   EKG, 12 LEAD, INITIAL    Collection Time: 08/05/19  1:20 PM   Result Value Ref Range    Ventricular Rate 53 BPM    Atrial Rate 53 BPM    P-R Interval 120 ms    QRS Duration 92 ms    Q-T Interval 472 ms    QTC Calculation (Bezet) 442 ms    Calculated P Axis 63 degrees    Calculated R Axis -25 degrees    Calculated T Axis 37 degrees    Diagnosis       Sinus bradycardia  Septal infarct , age undetermined  Abnormal ECG  No previous ECGs available       Imaging /Procedures /Studies   Final [99] 8/05/2019 12:09 8/05/2019 12:28   Study Result     RIGHT UPPER QUADRANT  ULTRASOUND     INDICATION: Abdominal pain gallstones     COMPARISON: 05/07/2019     Transabdominal imaging of the upper abdomen was performed.     FINDINGS:   -LIVER: 17 cm. Normal echogenicity. No masses. -BILE DUCTS: Moderate bile duct dilatation. CBD diameter = 17 mm. At least one  stone in the common duct. -GALLBLADDER: Cholelithiasis.   No significant gallbladder wall thickening. -PANCREAS: Normal.     -RIGHT KIDNEY: 10.1 cm.  1 cm upper pole cyst.  No mass or hydronephrosis. -ABDOMINAL AORTA AND IVC: Normal in size. -ASCITES: No free fluid.     IMPRESSION  IMPRESSION: Cholelithiasis and choledocholithiasis. Stable bile duct  dilatation.          Assessment and Plan: Active Hospital Problems    Diagnosis Date Noted    GERD (gastroesophageal reflux disease) 08/05/2019    Choledocholithiasis 08/05/2019       A/P  - Choledohcolithiasis - IVF, npo, empiric IV atbx (merrem given allergy to pcn and flagyl). GI following.  Plan for ERCP in AM    - GERD - IV PPI    Code Status: Full code    Anticipated discharge: 2-3 days    Signed By: Valdo Briones DO     August 5, 2019

## 2019-08-06 ENCOUNTER — APPOINTMENT (OUTPATIENT)
Dept: GENERAL RADIOLOGY | Age: 69
DRG: 446 | End: 2019-08-06
Attending: INTERNAL MEDICINE
Payer: MEDICARE

## 2019-08-06 LAB
ALBUMIN SERPL-MCNC: 3 G/DL (ref 3.2–4.6)
ALBUMIN/GLOB SERPL: 0.9 {RATIO} (ref 1.2–3.5)
ALP SERPL-CCNC: 425 U/L (ref 50–136)
ALT SERPL-CCNC: 272 U/L (ref 12–65)
ANION GAP SERPL CALC-SCNC: 7 MMOL/L (ref 7–16)
ANION GAP SERPL CALC-SCNC: 9 MMOL/L (ref 7–16)
AST SERPL-CCNC: 102 U/L (ref 15–37)
ATRIAL RATE: 131 BPM
ATRIAL RATE: 53 BPM
BASOPHILS # BLD: 0 K/UL (ref 0–0.2)
BASOPHILS NFR BLD: 0 % (ref 0–2)
BILIRUB SERPL-MCNC: 7.6 MG/DL (ref 0.2–1.1)
BUN SERPL-MCNC: 12 MG/DL (ref 8–23)
BUN SERPL-MCNC: 12 MG/DL (ref 8–23)
CALCIUM SERPL-MCNC: 8.5 MG/DL (ref 8.3–10.4)
CALCIUM SERPL-MCNC: 8.6 MG/DL (ref 8.3–10.4)
CALCULATED P AXIS, ECG09: 63 DEGREES
CALCULATED R AXIS, ECG10: -25 DEGREES
CALCULATED R AXIS, ECG10: -55 DEGREES
CALCULATED T AXIS, ECG11: -173 DEGREES
CALCULATED T AXIS, ECG11: 37 DEGREES
CHLORIDE SERPL-SCNC: 108 MMOL/L (ref 98–107)
CHLORIDE SERPL-SCNC: 108 MMOL/L (ref 98–107)
CO2 SERPL-SCNC: 26 MMOL/L (ref 21–32)
CO2 SERPL-SCNC: 27 MMOL/L (ref 21–32)
CREAT SERPL-MCNC: 0.76 MG/DL (ref 0.6–1)
CREAT SERPL-MCNC: 0.79 MG/DL (ref 0.6–1)
DIAGNOSIS, 93000: NORMAL
DIAGNOSIS, 93000: NORMAL
DIFFERENTIAL METHOD BLD: ABNORMAL
EOSINOPHIL # BLD: 0 K/UL (ref 0–0.8)
EOSINOPHIL NFR BLD: 0 % (ref 0.5–7.8)
ERYTHROCYTE [DISTWIDTH] IN BLOOD BY AUTOMATED COUNT: 14.4 % (ref 11.9–14.6)
GLOBULIN SER CALC-MCNC: 3.2 G/DL (ref 2.3–3.5)
GLUCOSE SERPL-MCNC: 92 MG/DL (ref 65–100)
GLUCOSE SERPL-MCNC: 93 MG/DL (ref 65–100)
HCT VFR BLD AUTO: 40.7 % (ref 35.8–46.3)
HGB BLD-MCNC: 13.1 G/DL (ref 11.7–15.4)
IMM GRANULOCYTES # BLD AUTO: 0.1 K/UL (ref 0–0.5)
IMM GRANULOCYTES NFR BLD AUTO: 1 % (ref 0–5)
LYMPHOCYTES # BLD: 1 K/UL (ref 0.5–4.6)
LYMPHOCYTES NFR BLD: 9 % (ref 13–44)
MAGNESIUM SERPL-MCNC: 1.8 MG/DL (ref 1.8–2.4)
MCH RBC QN AUTO: 28.2 PG (ref 26.1–32.9)
MCHC RBC AUTO-ENTMCNC: 32.2 G/DL (ref 31.4–35)
MCV RBC AUTO: 87.7 FL (ref 79.6–97.8)
MONOCYTES # BLD: 1.1 K/UL (ref 0.1–1.3)
MONOCYTES NFR BLD: 10 % (ref 4–12)
NEUTS SEG # BLD: 8.3 K/UL (ref 1.7–8.2)
NEUTS SEG NFR BLD: 79 % (ref 43–78)
NRBC # BLD: 0 K/UL (ref 0–0.2)
P-R INTERVAL, ECG05: 120 MS
PLATELET # BLD AUTO: 297 K/UL (ref 150–450)
PMV BLD AUTO: 10.6 FL (ref 9.4–12.3)
POTASSIUM SERPL-SCNC: 3.4 MMOL/L (ref 3.5–5.1)
POTASSIUM SERPL-SCNC: 3.5 MMOL/L (ref 3.5–5.1)
PROT SERPL-MCNC: 6.2 G/DL (ref 6.3–8.2)
Q-T INTERVAL, ECG07: 274 MS
Q-T INTERVAL, ECG07: 472 MS
QRS DURATION, ECG06: 90 MS
QRS DURATION, ECG06: 92 MS
QTC CALCULATION (BEZET), ECG08: 413 MS
QTC CALCULATION (BEZET), ECG08: 442 MS
RBC # BLD AUTO: 4.64 M/UL (ref 4.05–5.2)
SODIUM SERPL-SCNC: 142 MMOL/L (ref 136–145)
SODIUM SERPL-SCNC: 143 MMOL/L (ref 136–145)
TROPONIN I SERPL-MCNC: <0.02 NG/ML (ref 0.02–0.05)
VENTRICULAR RATE, ECG03: 137 BPM
VENTRICULAR RATE, ECG03: 53 BPM
WBC # BLD AUTO: 10.5 K/UL (ref 4.3–11.1)

## 2019-08-06 PROCEDURE — 84484 ASSAY OF TROPONIN QUANT: CPT

## 2019-08-06 PROCEDURE — 76060000033 HC ANESTHESIA 1 TO 1.5 HR: Performed by: INTERNAL MEDICINE

## 2019-08-06 PROCEDURE — BF100ZZ FLUOROSCOPY OF BILE DUCTS USING HIGH OSMOLAR CONTRAST: ICD-10-PCS | Performed by: INTERNAL MEDICINE

## 2019-08-06 PROCEDURE — 74011000250 HC RX REV CODE- 250: Performed by: INTERNAL MEDICINE

## 2019-08-06 PROCEDURE — 74011000258 HC RX REV CODE- 258: Performed by: INTERNAL MEDICINE

## 2019-08-06 PROCEDURE — 93005 ELECTROCARDIOGRAM TRACING: CPT | Performed by: ANESTHESIOLOGY

## 2019-08-06 PROCEDURE — 74011250636 HC RX REV CODE- 250/636

## 2019-08-06 PROCEDURE — 77030032490 HC SLV COMPR SCD KNE COVD -B: Performed by: INTERNAL MEDICINE

## 2019-08-06 PROCEDURE — 77030037088 HC TUBE ENDOTRACH ORAL NSL COVD-A: Performed by: ANESTHESIOLOGY

## 2019-08-06 PROCEDURE — 65270000029 HC RM PRIVATE

## 2019-08-06 PROCEDURE — 77030020263 HC SOL INJ SOD CL0.9% LFCR 1000ML

## 2019-08-06 PROCEDURE — 74011636320 HC RX REV CODE- 636/320: Performed by: INTERNAL MEDICINE

## 2019-08-06 PROCEDURE — 74330 X-RAY BILE/PANC ENDOSCOPY: CPT

## 2019-08-06 PROCEDURE — 74011250637 HC RX REV CODE- 250/637: Performed by: NURSE PRACTITIONER

## 2019-08-06 PROCEDURE — 74011250636 HC RX REV CODE- 250/636: Performed by: INTERNAL MEDICINE

## 2019-08-06 PROCEDURE — 77030007288 HC DEV LOK BILI BSC -A: Performed by: INTERNAL MEDICINE

## 2019-08-06 PROCEDURE — C9113 INJ PANTOPRAZOLE SODIUM, VIA: HCPCS | Performed by: INTERNAL MEDICINE

## 2019-08-06 PROCEDURE — 0F798DZ DILATION OF COMMON BILE DUCT WITH INTRALUMINAL DEVICE, VIA NATURAL OR ARTIFICIAL OPENING ENDOSCOPIC: ICD-10-PCS | Performed by: INTERNAL MEDICINE

## 2019-08-06 PROCEDURE — 76040000026: Performed by: INTERNAL MEDICINE

## 2019-08-06 PROCEDURE — 85025 COMPLETE CBC W/AUTO DIFF WBC: CPT

## 2019-08-06 PROCEDURE — C2625 STENT, NON-COR, TEM W/DEL SY: HCPCS | Performed by: INTERNAL MEDICINE

## 2019-08-06 PROCEDURE — 80053 COMPREHEN METABOLIC PANEL: CPT

## 2019-08-06 PROCEDURE — 77030039425 HC BLD LARYNG TRULITE DISP TELE -A: Performed by: ANESTHESIOLOGY

## 2019-08-06 PROCEDURE — 83735 ASSAY OF MAGNESIUM: CPT

## 2019-08-06 PROCEDURE — 65660000000 HC RM CCU STEPDOWN

## 2019-08-06 PROCEDURE — 77030009038 HC CATH BILI STN RTVR BSC -C: Performed by: INTERNAL MEDICINE

## 2019-08-06 PROCEDURE — 74011250636 HC RX REV CODE- 250/636: Performed by: ANESTHESIOLOGY

## 2019-08-06 PROCEDURE — 74011000250 HC RX REV CODE- 250: Performed by: ANESTHESIOLOGY

## 2019-08-06 PROCEDURE — 74011000250 HC RX REV CODE- 250

## 2019-08-06 PROCEDURE — 77030012595 HC SPHNTOM BILI BSC -D: Performed by: INTERNAL MEDICINE

## 2019-08-06 PROCEDURE — 36415 COLL VENOUS BLD VENIPUNCTURE: CPT

## 2019-08-06 DEVICE — BILIARY STENT WITH RX DELIVERY SYSTEM
Type: IMPLANTABLE DEVICE | Site: BILE DUCT | Status: FUNCTIONAL
Brand: RX BILIARY

## 2019-08-06 RX ORDER — SODIUM CHLORIDE, SODIUM LACTATE, POTASSIUM CHLORIDE, CALCIUM CHLORIDE 600; 310; 30; 20 MG/100ML; MG/100ML; MG/100ML; MG/100ML
1000 INJECTION, SOLUTION INTRAVENOUS CONTINUOUS
Status: DISCONTINUED | OUTPATIENT
Start: 2019-08-06 | End: 2019-08-06 | Stop reason: HOSPADM

## 2019-08-06 RX ORDER — LIDOCAINE HYDROCHLORIDE 20 MG/ML
INJECTION, SOLUTION EPIDURAL; INFILTRATION; INTRACAUDAL; PERINEURAL AS NEEDED
Status: DISCONTINUED | OUTPATIENT
Start: 2019-08-06 | End: 2019-08-06 | Stop reason: HOSPADM

## 2019-08-06 RX ORDER — FENTANYL CITRATE 50 UG/ML
INJECTION, SOLUTION INTRAMUSCULAR; INTRAVENOUS AS NEEDED
Status: DISCONTINUED | OUTPATIENT
Start: 2019-08-06 | End: 2019-08-06 | Stop reason: HOSPADM

## 2019-08-06 RX ORDER — SUCCINYLCHOLINE CHLORIDE 20 MG/ML
INJECTION INTRAMUSCULAR; INTRAVENOUS AS NEEDED
Status: DISCONTINUED | OUTPATIENT
Start: 2019-08-06 | End: 2019-08-06 | Stop reason: HOSPADM

## 2019-08-06 RX ORDER — ESMOLOL HYDROCHLORIDE 10 MG/ML
INJECTION INTRAVENOUS AS NEEDED
Status: DISCONTINUED | OUTPATIENT
Start: 2019-08-06 | End: 2019-08-06 | Stop reason: HOSPADM

## 2019-08-06 RX ORDER — IBUPROFEN 200 MG
1 TABLET ORAL EVERY 24 HOURS
Status: DISCONTINUED | OUTPATIENT
Start: 2019-08-06 | End: 2019-08-07 | Stop reason: HOSPADM

## 2019-08-06 RX ORDER — OXYCODONE HYDROCHLORIDE 5 MG/1
5 TABLET ORAL ONCE
Status: DISCONTINUED | OUTPATIENT
Start: 2019-08-06 | End: 2019-08-06 | Stop reason: HOSPADM

## 2019-08-06 RX ORDER — METOPROLOL TARTRATE 5 MG/5ML
5 INJECTION INTRAVENOUS ONCE
Status: COMPLETED | OUTPATIENT
Start: 2019-08-06 | End: 2019-08-06

## 2019-08-06 RX ORDER — ROCURONIUM BROMIDE 10 MG/ML
INJECTION, SOLUTION INTRAVENOUS AS NEEDED
Status: DISCONTINUED | OUTPATIENT
Start: 2019-08-06 | End: 2019-08-06 | Stop reason: HOSPADM

## 2019-08-06 RX ORDER — ONDANSETRON 2 MG/ML
INJECTION INTRAMUSCULAR; INTRAVENOUS AS NEEDED
Status: DISCONTINUED | OUTPATIENT
Start: 2019-08-06 | End: 2019-08-06 | Stop reason: HOSPADM

## 2019-08-06 RX ORDER — ONDANSETRON 2 MG/ML
4 INJECTION INTRAMUSCULAR; INTRAVENOUS ONCE
Status: COMPLETED | OUTPATIENT
Start: 2019-08-06 | End: 2019-08-06

## 2019-08-06 RX ORDER — SODIUM CHLORIDE, SODIUM LACTATE, POTASSIUM CHLORIDE, CALCIUM CHLORIDE 600; 310; 30; 20 MG/100ML; MG/100ML; MG/100ML; MG/100ML
INJECTION, SOLUTION INTRAVENOUS
Status: DISCONTINUED | OUTPATIENT
Start: 2019-08-06 | End: 2019-08-06 | Stop reason: HOSPADM

## 2019-08-06 RX ORDER — PROPOFOL 10 MG/ML
INJECTION, EMULSION INTRAVENOUS AS NEEDED
Status: DISCONTINUED | OUTPATIENT
Start: 2019-08-06 | End: 2019-08-06 | Stop reason: HOSPADM

## 2019-08-06 RX ORDER — DEXAMETHASONE SODIUM PHOSPHATE 4 MG/ML
INJECTION, SOLUTION INTRA-ARTICULAR; INTRALESIONAL; INTRAMUSCULAR; INTRAVENOUS; SOFT TISSUE AS NEEDED
Status: DISCONTINUED | OUTPATIENT
Start: 2019-08-06 | End: 2019-08-06 | Stop reason: HOSPADM

## 2019-08-06 RX ADMIN — PROPOFOL 20 MG: 10 INJECTION, EMULSION INTRAVENOUS at 14:28

## 2019-08-06 RX ADMIN — Medication 10 ML: at 06:30

## 2019-08-06 RX ADMIN — SODIUM CHLORIDE, SODIUM LACTATE, POTASSIUM CHLORIDE, AND CALCIUM CHLORIDE 1000 ML: 600; 310; 30; 20 INJECTION, SOLUTION INTRAVENOUS at 13:51

## 2019-08-06 RX ADMIN — METOPROLOL TARTRATE 5 MG: 5 INJECTION INTRAVENOUS at 15:35

## 2019-08-06 RX ADMIN — ONDANSETRON 4 MG: 2 INJECTION INTRAMUSCULAR; INTRAVENOUS at 15:46

## 2019-08-06 RX ADMIN — ESMOLOL HYDROCHLORIDE 30 MG: 10 INJECTION INTRAVENOUS at 14:47

## 2019-08-06 RX ADMIN — MEROPENEM 500 MG: 500 INJECTION INTRAVENOUS at 17:12

## 2019-08-06 RX ADMIN — SODIUM CHLORIDE, SODIUM LACTATE, POTASSIUM CHLORIDE, CALCIUM CHLORIDE: 600; 310; 30; 20 INJECTION, SOLUTION INTRAVENOUS at 13:53

## 2019-08-06 RX ADMIN — ESMOLOL HYDROCHLORIDE 40 MG: 10 INJECTION INTRAVENOUS at 14:59

## 2019-08-06 RX ADMIN — SODIUM CHLORIDE 125 ML/HR: 900 INJECTION, SOLUTION INTRAVENOUS at 12:28

## 2019-08-06 RX ADMIN — ROCURONIUM BROMIDE 5 MG: 10 INJECTION, SOLUTION INTRAVENOUS at 14:11

## 2019-08-06 RX ADMIN — LIDOCAINE HYDROCHLORIDE 40 MG: 20 INJECTION, SOLUTION EPIDURAL; INFILTRATION; INTRACAUDAL; PERINEURAL at 14:40

## 2019-08-06 RX ADMIN — MEROPENEM 500 MG: 500 INJECTION INTRAVENOUS at 02:13

## 2019-08-06 RX ADMIN — DEXAMETHASONE SODIUM PHOSPHATE 4 MG: 4 INJECTION, SOLUTION INTRA-ARTICULAR; INTRALESIONAL; INTRAMUSCULAR; INTRAVENOUS; SOFT TISSUE at 14:20

## 2019-08-06 RX ADMIN — PANTOPRAZOLE SODIUM 40 MG: 40 INJECTION, POWDER, FOR SOLUTION INTRAVENOUS at 09:00

## 2019-08-06 RX ADMIN — SODIUM CHLORIDE 125 ML/HR: 900 INJECTION, SOLUTION INTRAVENOUS at 02:21

## 2019-08-06 RX ADMIN — IOPAMIDOL 200 ML: 755 INJECTION, SOLUTION INTRAVENOUS at 14:56

## 2019-08-06 RX ADMIN — PROPOFOL 180 MG: 10 INJECTION, EMULSION INTRAVENOUS at 14:11

## 2019-08-06 RX ADMIN — HYDROMORPHONE HYDROCHLORIDE 0.5 MG: 1 INJECTION, SOLUTION INTRAMUSCULAR; INTRAVENOUS; SUBCUTANEOUS at 02:32

## 2019-08-06 RX ADMIN — FENTANYL CITRATE 25 MCG: 50 INJECTION, SOLUTION INTRAMUSCULAR; INTRAVENOUS at 14:31

## 2019-08-06 RX ADMIN — ONDANSETRON 4 MG: 2 INJECTION INTRAMUSCULAR; INTRAVENOUS at 02:32

## 2019-08-06 RX ADMIN — ESMOLOL HYDROCHLORIDE 30 MG: 10 INJECTION INTRAVENOUS at 14:54

## 2019-08-06 RX ADMIN — MEROPENEM 500 MG: 500 INJECTION INTRAVENOUS at 09:03

## 2019-08-06 RX ADMIN — LIDOCAINE HYDROCHLORIDE 80 MG: 20 INJECTION, SOLUTION EPIDURAL; INFILTRATION; INTRACAUDAL; PERINEURAL at 14:11

## 2019-08-06 RX ADMIN — ONDANSETRON 4 MG: 2 INJECTION INTRAMUSCULAR; INTRAVENOUS at 14:55

## 2019-08-06 RX ADMIN — Medication 10 ML: at 22:51

## 2019-08-06 RX ADMIN — SUCCINYLCHOLINE CHLORIDE 160 MG: 20 INJECTION INTRAMUSCULAR; INTRAVENOUS at 14:12

## 2019-08-06 NOTE — PROGRESS NOTES
Hospitalist Progress Note     Admit Date:  2019  4:23 PM   Name:  Nohemi Lezama   Age:  68 y.o.  :  1950   MRN:  [id number]   PCP:  Scott Fuller PA-C  Treatment Team: Attending Provider: Devonte Escobar DO; Consulting Provider: Radha Arana MD; Utilization Review: Kay Springer RN    Subjective:     Patient  is a 67 yo female  with pmhx significant for GERD, gall stones,  and tobacco abuse who presented to the ER with nausea, vomiting, and midepigastric abdominal pain radiating to her back. ED workup notable for WBC 12.7K, , , Alk phos 575, lipase 224, and US showing cholelithiasis and choledocolithiasis. Patient was transferred from  to [facility] for ERCP by GI planned for today.      Subjective  Patient alert and oriented, reports improvement in pain with pain medication. Reports HA from caffeine withdrawal. Denies N/V/D.        Objective:     Patient Vitals for the past 24 hrs:   Temp Pulse Resp BP SpO2   19 1045 98.5 °F (36.9 °C) (!) 54 18 114/49 91 %   19 0523 99.7 °F (37.6 °C) (!) 55 18 135/67 91 %   19 2321 98.4 °F (36.9 °C) 60 18 130/64 93 %   19 1959 96.9 °F (36.1 °C) (!) 54 18 145/75 92 %   19 1623 98.2 °F (36.8 °C) (!) 49 18 134/62 93 %     Oxygen Therapy  O2 Sat (%): 91 % (19 1045)    Intake/Output Summary (Last 24 hours) at 2019 1142  Last data filed at 2019 0947  Gross per 24 hour   Intake 0 ml   Output --   Net 0 ml         General:    Well nourished. Alert. CV:   RRR. No murmur, rub, or gallop. Lungs:   CTAB. No wheezing, rhonchi, or rales. Abdomen:   Soft, nontender, nondistended. Extremities: Warm and dry. No cyanosis or edema. Skin:     No rashes or jaundice. Data Review:  I have reviewed all labs, meds, telemetry events, and studies from the last 24 hours.     Recent Results (from the past 24 hour(s))   EKG, 12 LEAD, INITIAL    Collection Time: 19  1:20 PM   Result Value Ref Range Ventricular Rate 53 BPM    Atrial Rate 53 BPM    P-R Interval 120 ms    QRS Duration 92 ms    Q-T Interval 472 ms    QTC Calculation (Bezet) 442 ms    Calculated P Axis 63 degrees    Calculated R Axis -25 degrees    Calculated T Axis 37 degrees    Diagnosis       Sinus bradycardia  Septal infarct , age undetermined  Abnormal ECG  No previous ECGs available  Confirmed by CARMEN LANDIN (), MILY TODD ([id number]) on 8/6/2019 8:54:02 AM     CBC WITH AUTOMATED DIFF    Collection Time: 08/06/19 10:16 AM   Result Value Ref Range    WBC 10.5 4.3 - 11.1 K/uL    RBC 4.64 4.05 - 5.2 M/uL    HGB 13.1 11.7 - 15.4 g/dL    HCT 40.7 35.8 - 46.3 %    MCV 87.7 79.6 - 97.8 FL    MCH 28.2 26.1 - 32.9 PG    MCHC 32.2 31.4 - 35.0 g/dL    RDW 14.4 11.9 - 14.6 %    PLATELET 202 055 - 218 K/uL    MPV 10.6 9.4 - 12.3 FL    ABSOLUTE NRBC 0.00 0.0 - 0.2 K/uL    DF AUTOMATED      NEUTROPHILS 79 (H) 43 - 78 %    LYMPHOCYTES 9 (L) 13 - 44 %    MONOCYTES 10 4.0 - 12.0 %    EOSINOPHILS 0 (L) 0.5 - 7.8 %    BASOPHILS 0 0.0 - 2.0 %    IMMATURE GRANULOCYTES 1 0.0 - 5.0 %    ABS. NEUTROPHILS 8.3 (H) 1.7 - 8.2 K/UL    ABS. LYMPHOCYTES 1.0 0.5 - 4.6 K/UL    ABS. MONOCYTES 1.1 0.1 - 1.3 K/UL    ABS. EOSINOPHILS 0.0 0.0 - 0.8 K/UL    ABS. BASOPHILS 0.0 0.0 - 0.2 K/UL    ABS. IMM. GRANS. 0.1 0.0 - 0.5 K/UL   METABOLIC PANEL, COMPREHENSIVE    Collection Time: 08/06/19 10:16 AM   Result Value Ref Range    Sodium 143 136 - 145 mmol/L    Potassium 3.5 3.5 - 5.1 mmol/L    Chloride 108 (H) 98 - 107 mmol/L    CO2 26 21 - 32 mmol/L    Anion gap 9 7 - 16 mmol/L    Glucose 92 65 - 100 mg/dL    BUN 12 8 - 23 MG/DL    Creatinine 0.79 0.6 - 1.0 MG/DL    GFR est AA >60 >60 ml/min/1.73m2    GFR est non-AA >60 >60 ml/min/1.73m2    Calcium 8.6 8.3 - 10.4 MG/DL    Bilirubin, total 7.6 (H) 0.2 - 1.1 MG/DL    ALT (SGPT) 272 (H) 12 - 65 U/L    AST (SGOT) 102 (H) 15 - 37 U/L    Alk.  phosphatase 425 (H) 50 - 136 U/L    Protein, total 6.2 (L) 6.3 - 8.2 g/dL    Albumin 3.0 (L) 3.2 - 4.6 g/dL Globulin 3.2 2.3 - 3.5 g/dL    A-G Ratio 0.9 (L) 1.2 - 3.5          All Micro Results     None          Current Meds:  Current Facility-Administered Medications   Medication Dose Route Frequency    sodium chloride (NS) flush 5-40 mL  5-40 mL IntraVENous Q8H    sodium chloride (NS) flush 5-40 mL  5-40 mL IntraVENous PRN    HYDROmorphone (PF) (DILAUDID) injection 0.5 mg  0.5 mg IntraVENous Q4H PRN    ondansetron (ZOFRAN) injection 4 mg  4 mg IntraVENous Q4H PRN    0.9% sodium chloride infusion  125 mL/hr IntraVENous CONTINUOUS    pantoprazole (PROTONIX) 40 mg in sodium chloride 0.9% 10 mL injection  40 mg IntraVENous DAILY    meropenem (MERREM) 500 mg in 0.9% sodium chloride (MBP/ADV) 50 mL  0.5 g IntraVENous Q8H       Other Studies (last 24 hours):  [employer]    Result Date: 8/5/2019  RIGHT UPPER QUADRANT  ULTRASOUND INDICATION: Abdominal pain gallstones COMPARISON: 05/07/2019 Transabdominal imaging of the upper abdomen was performed. FINDINGS: -LIVER: 17 cm. Normal echogenicity. No masses. -BILE DUCTS: Moderate bile duct dilatation. CBD diameter = 17 mm. At least one stone in the common duct. -GALLBLADDER: Cholelithiasis. No significant gallbladder wall thickening. -PANCREAS: Normal. -RIGHT KIDNEY: 10.1 cm.  1 cm upper pole cyst.  No mass or hydronephrosis. -ABDOMINAL AORTA AND IVC: Normal in size. -ASCITES: No free fluid. IMPRESSION: Cholelithiasis and choledocholithiasis. Stable bile duct dilatation. Assessment and Plan:     Hospital Problems as of 8/6/2019 Date Reviewed: 5/21/2019          Codes Class Noted - Resolved POA    GERD (gastroesophageal reflux disease) ICD-10-CM: K21.9  ICD-9-CM: 530.81  8/5/2019 - Present Unknown        Choledocholithiasis ICD-10-CM: K80.50  ICD-9-CM: 574.50  8/5/2019 - Present Unknown              PLAN:    Choledohcolithiasis - IVF, npo, empiric IV atbx (merrem given allergy to pcn and flagyl). - GI following.  Plan for ERCP today.       GERD - IV PPI    Tobacco abuse- nicoderm patch, cessation counseling      Code Status: Full code     Anticipated discharge: 2-3 days    DC planning/Dispo:  home  DVT ppx: SCD's      Plan of care discussed with Dr. Lissette Gamez,   Signed:  CELESTE LariosC

## 2019-08-06 NOTE — PERIOP NOTES
TRANSFER - OUT REPORT:    Verbal report given to Carlyn RN(name) on [facility]  being transferred to room 634(unit) for routine post - op       Report consisted of patients Situation, Background, Assessment and   Recommendations(SBAR). Information from the following report(s) SBAR, Kardex, Procedure Summary and MAR was reviewed with the receiving nurse. Lines:   Peripheral IV 08/06/19 Left Arm (Active)   Site Assessment Clean, dry, & intact 8/6/2019  3:10 PM   Phlebitis Assessment 0 8/6/2019  3:10 PM   Infiltration Assessment 0 8/6/2019  3:10 PM   Dressing Status Clean, dry, & intact 8/6/2019  3:10 PM   Dressing Type Tape;Transparent 8/6/2019  3:10 PM   Hub Color/Line Status Blue; Infusing 8/6/2019  3:10 PM        Opportunity for questions and clarification was provided. Patient transported with:   O2 @ 4 liters    VTE prophylaxis orders have not been written for [facility]. Patient and family given floor number and nurses name.

## 2019-08-06 NOTE — PROGRESS NOTES
Hourly rounds completed this shift. All needs met. Bed low/locked. No c/o pain. Pt returned from ERCP requesting food. GI MD on call notified. Dr. Britt Armendariz with GI ok with pt having clear liquid diet. Tele box placed on pt-pt currently NSR per monitor room. Family at bedside. Call light in reach. Will continue to monitor pt and give report to oncoming RN. Peripheral IV 08/06/19 Left Arm (Active)   Site Assessment Clean, dry, & intact 8/6/2019  4:11 PM   Phlebitis Assessment 0 8/6/2019  4:11 PM   Infiltration Assessment 0 8/6/2019  4:11 PM   Dressing Status Clean, dry, & intact 8/6/2019  4:11 PM   Dressing Type Transparent;Tape 8/6/2019  4:11 PM   Hub Color/Line Status Blue; Infusing 8/6/2019  4:11 PM

## 2019-08-06 NOTE — ANESTHESIA PREPROCEDURE EVALUATION
Relevant Problems   No relevant active problems       Anesthetic History   No history of anesthetic complications            Review of Systems / Medical History  Patient summary reviewed and pertinent labs reviewed    Pulmonary          Smoker (1 ppd x 50 years)         Neuro/Psych   Within defined limits           Cardiovascular  Within defined limits                Exercise tolerance: >4 METS  Comments: Denies CP, SOB or changes in functional status   GI/Hepatic/Renal     GERD: well controlled           Endo/Other  Within defined limits           Other Findings              Physical Exam    Airway  Mallampati: II  TM Distance: 4 - 6 cm  Neck ROM: normal range of motion   Mouth opening: Normal     Cardiovascular  Regular rate and rhythm,  S1 and S2 normal,  no murmur, click, rub, or gallop  Rhythm: regular  Rate: normal         Dental    Dentition: Bridges     Pulmonary  Breath sounds clear to auscultation               Abdominal  GI exam deferred       Other Findings            Anesthetic Plan    ASA: 2  Anesthesia type: general          Induction: Intravenous  Anesthetic plan and risks discussed with: Patient

## 2019-08-06 NOTE — PROGRESS NOTES
TRANSFER - OUT REPORT:    Verbal report given to Krystle RN(name) on Josse Qiu  being transferred to GI Lab(unit) for ordered procedure       Report consisted of patients Situation, Background, Assessment and   Recommendations(SBAR). Information from the following report(s) SBAR and MAR was reviewed with the receiving nurse. Lines:   Peripheral IV 08/06/19 Left Arm (Active)   Site Assessment Clean, dry, & intact 8/6/2019  6:56 AM   Phlebitis Assessment 0 8/6/2019  6:56 AM   Infiltration Assessment 0 8/6/2019  6:56 AM   Dressing Status Clean, dry, & intact 8/6/2019  6:56 AM   Dressing Type Transparent;Tape 8/6/2019  6:56 AM   Hub Color/Line Status Blue; Infusing;Patent 8/6/2019  6:56 AM        Opportunity for questions and clarification was provided.       Patient transported with:            Pt to be transported around 12:45pm.

## 2019-08-06 NOTE — PROGRESS NOTES
TRANSFER - IN REPORT:    Verbal report received from Charmaine(name) on Zana Rowe  being received from 634(unit) for routine progression of care      Report consisted of patients Situation, Background, Assessment and   Recommendations(SBAR). Information from the following report(s) SBAR and Kardex was reviewed with the receiving nurse. Opportunity for questions and clarification was provided. Assessment completed upon patients arrival to unit and care assumed.

## 2019-08-06 NOTE — CONSULTS
[facility] Consult Note       Primary GI Physician: Balbir-Dr Lula Ruiz    Referring Provider:  Dr Corinne Perez    Consult Date:  8/6/2019    Admit Date:  8/5/2019    Chief Complaint:  choledocholithiasis    Subjective:     History of Present Illness:  Patient is a 68 y.o. female with PMH of GERD, who is seen in consultation at the request of Dr. Corinne Perez for choledocholithiasis. Pt presented to ED yesterday with nausea, vomiting, and abdominal pain x 24 hours. She has a known history of gallstones on imaging. ED evaluation with WBC 12.7, T bili 7.7, , , , lipase 224. Ultrasound 8/5/19 with moderate bile duct dilation, at least one stone in CBD, cholelithiasis. She was transferred to [facility] and admitted to hospitalist. She has been kept NPO. Pantoprazole 40mg IV daily has been started as well as Merrem 500mg [id number]. Currently pain is well controlled. She is not having any nausea or vomiting at this time. She reports pain actually started in Feb 2019. This lasted for several days with associated nausea. Symptoms returned in April, with RUQ and \"gassy\" sensation in chest. She had previous ultrasound 5/7/19 for RUQ pain x 2 months which showed cholelithiasis with probable choledocholithiasis. She was seen By Dr Bernard Fontana for evaluation for cholecystectomy 5/2019. During that visit, there was note of family history of colon cancer. There was no history of abnormal LFTs at that time. Pt did undergo EGD and colonoscopy, but cholecystectomy was not recommended unless she had another episode of RUQ. EGD 7/10/19 with gastritis and benign biopsies. Colonoscopy at that time with sigmoid diverticulosis. Recall 10 years.          PMH:  Past Medical History:   Diagnosis Date    GERD (gastroesophageal reflux disease)     Smoker     1 ppd x 50 years       PSH:  Past Surgical History:   Procedure Laterality Date    COLONOSCOPY N/A 7/10/2019    COLONOSCOPY performed by Piyush Nath [facility] at [facility] ENDOSCOPY    CO COLONOSCOPY FLX DX W/COLLJ SPEC WHEN PFRMD  7/10/2019         CO EGD TRANSORAL BIOPSY SINGLE/MULTIPLE  7/10/2019            Allergies: Allergies   Allergen Reactions    Codeine Other (comments)     Stomach ache    Doxycycline Other (comments)     Stomach aches    Flagyl [Metronidazole] Itching    Penicillins Rash       Home Medications:  Prior to Admission medications    Medication Sig Start Date End Date Taking? Authorizing Provider   sucralfate (CARAFATE) 1 gram tablet TAKE 1 TABLET BY MOUTH FOUR TIMES DAILY 7/10/19   Gladys Xie MD   omeprazole (PRILOSEC) 20 mg capsule 1 capsule bid 6/11/19   Tiny OSBORNE PA-C   Biotin 2,500 mcg cap Take  by mouth. Indications: patient takes med every other day    Provider, Historical       Hospital Medications:  Current Facility-Administered Medications   Medication Dose Route Frequency    sodium chloride (NS) flush 5-40 mL  5-40 mL IntraVENous Q8H    sodium chloride (NS) flush 5-40 mL  5-40 mL IntraVENous PRN    HYDROmorphone (PF) (DILAUDID) injection 0.5 mg  0.5 mg IntraVENous Q4H PRN    ondansetron (ZOFRAN) injection 4 mg  4 mg IntraVENous Q4H PRN    0.9% sodium chloride infusion  125 mL/hr IntraVENous CONTINUOUS    pantoprazole (PROTONIX) 40 mg in sodium chloride 0.9% 10 mL injection  40 mg IntraVENous DAILY    meropenem (MERREM) 500 mg in 0.9% sodium chloride (MBP/ADV) 50 mL  0.5 g IntraVENous Q8H       Social History:  Social History     Tobacco Use    Smoking status: Current Every Day Smoker     Packs/day: 1.00     Years: 50.00     Pack years: 50.00    Smokeless tobacco: Never Used   Substance Use Topics    Alcohol use: Not Currently       Pt denies any history of drug use, blood transfusions, or tattoos.     Family History:  Family History   Problem Relation Age of Onset   [place] Cancer Mother     COPD Mother     No Known Problems Father        Review of Systems:  A detailed 10 system ROS is obtained, with pertinent positives as listed above. All others are negative. Diet:  NPO    Objective:     Physical Exam:  Vitals:  Visit Vitals  /67 (BP 1 Location: Right arm, BP Patient Position: At rest)   Pulse (!) 55   Temp 99.7 °F (37.6 °C)   Resp 18   SpO2 91%     Gen:  Pt is alert, cooperative, no acute distress  Skin:  Extremities and face reveal no rashes. HEENT: Sclerae anicteric. Extra-occular muscles are intact. No oral ulcers. No abnormal pigmentation of the lips. The neck is supple. Cardiovascular: Regular rate and rhythm. No murmurs, gallops, or rubs. Respiratory:  Comfortable breathing with no accessory muscle use. Clear breath sounds anteriorly with no wheezes, rales, or rhonchi. GI:  Abdomen nondistended, soft, mild epigastric TTP. Normal active bowel sounds. No enlargement of the liver or spleen. No masses palpable. Rectal:  Deferred  Musculoskeletal:  No pitting edema of the lower legs. Neurological:  Gross memory appears intact. Patient is alert and oriented. Psychiatric:  Mood appears appropriate with judgement intact. Lymphatic:  No cervical or supraclavicular adenopathy. Laboratory:    Recent Labs     08/05/19  1057   WBC 12.7*   HGB 16.5*   HCT 49.5*      MCV 85.2      K 3.8      CO2 27   BUN 14   CREA 0.92   CA 10.3   *   *   SGOT 297*   *   TBILI 7.7*   ALB 4.2   TP 8.4*   LPSE 224        EGD 7/10/19  Findings:   Oropharynx:  Normal  Esophagus:  Normal     - nominally irregular EG mucosal transition.   However, very large hiatal hernia noted with Z-line approximately 15cm above apparent diagphragmatic hiatus  Cardia of the stomach:  Normal  Body of the stomach:    - Flat lesions:     - mild friability, with mild to moderate biliary  gastritis  Fundus of the stomach:    - Flat lesions:     - mild gastritis  Antrum of the stomach:    - Flat lesions:     - moderate gastritis  Duodenum:  Normal   Therapies:    biopsy of stomach pre pyloric antrum (Biopsies benign)  Recommendations:  - Continue acid suppression.  - Await pathology. - trial of carafate, consider questran.     -no plan for cholecystectomy at this time   Signed by: Rose Vallecillo MD                         July 10, 2019        Colonoscopy 7/10/19  Findings:   ANUS: Anal exam reveals no masses or hemorrhoids, sphincter tone is normal.   RECTUM: Rectal exam reveals no masses or hemorrhoids. SIGMOID COLON: The mucosa is normal with good vascular pattern and without ulcers, or polyps. Mild to moderate diverticulosis noted. DESCENDING COLON: The mucosa is normal with good vascular pattern and without ulcers, diverticula, and polyps. SPLENIC FLEXURE: The splenic flexure is normal.   TRANSVERSE COLON: The mucosa is normal with good vascular pattern and without ulcers, diverticula, and polyps. HEPATIC FLEXURE: The hepatic flexure is normal.   ASCENDING COLON: The mucosa is normal with good vascular pattern and without ulcers, diverticula, and polyps. CECUM: The appendiceal orifice appears normal. The ileocecal valve appears normal.   TERMINAL ILEUM: The terminal ileum was normal.    Recommendations:   - For colon cancer screening in this average-risk patient, colonoscopy may be repeated in 10 years. Signed By: Rose Vallecillo MD                        July 10, 2019       ABDOMINAL ULTRASOUND  5/7/19   HISTORY: Right upper quadrant pain and epigastric pain for 2 months.   TECHNIQUE: Sonographic imaging of the right upper quadrant was performed.   COMPARISON: None   FINDINGS: The pancreatic head is grossly normal in appearance.   The liver parenchyma is homogeneous in echotexture without focal masses or  intrahepatic biliary ductal dilatation. Hepatopedal flow is present in the main  portal vein. The common bile duct is 6 mm in diameter at the hilum of the liver  and distended up to 2 cm more distally where it appears to contain an echogenic  shadowing stone.  Shadowing intraluminal gallstones are present. There is no  pericholecystic fluid. The gallbladder wall is mildly thickened up to 3.4 mm. The sonographic Lanier's sign is absent.   The right kidney is 10.4 cm in length. There is no hydronephrosis. There is 1.2  cm upper pole simple cyst in the right kidney.   The distal abdominal aorta is distended up to 1.9 cm in diameter. The IVC is  patent.   IMPRESSION  IMPRESSION: Cholelithiasis with probable choledocholithiasis. Mild gallbladder  wall thickening. Consider surgical evaluation and if indicated a nuclear  medicine hepatobiliary scan for further assessment. RIGHT UPPER QUADRANT  ULTRASOUND  8/5/19   INDICATION: Abdominal pain gallstones   COMPARISON: 05/07/2019   Transabdominal imaging of the upper abdomen was performed.   FINDINGS:   -LIVER: 17 cm. Normal echogenicity. No masses. -BILE DUCTS: Moderate bile duct dilatation. CBD diameter = 17 mm. At least one  stone in the common duct. -GALLBLADDER: Cholelithiasis. No significant gallbladder wall thickening. -PANCREAS: Normal.   -RIGHT KIDNEY: 10.1 cm.  1 cm upper pole cyst.  No mass or hydronephrosis. -ABDOMINAL AORTA AND IVC: Normal in size. -ASCITES: No free fluid.   IMPRESSION  IMPRESSION: Cholelithiasis and choledocholithiasis. Stable bile duct  dilatation. Assessment:     Active Problems:    GERD (gastroesophageal reflux disease) (8/5/2019)      Choledocholithiasis (8/5/2019)     Patient is a 68 y.o. female with PMH of GERD, who is seen in consultation at the request of Dr. Estevan Casanova for choledocholithiasis. Pt presented to ED yesterday with nausea, vomiting, and abdominal pain x 24 hours. She has a known history of gallstones on imaging. ED evaluation with WBC 12.7, T bili 7.7, , , , lipase 224. Ultrasound 8/5/19 with moderate bile duct dilation, at least one stone in CBD, cholelithiasis. She was transferred to [facility] and admitted to hospitalist. She has been kept NPO.  Pantoprazole 40mg IV daily has been started as well as Merrem 500mg [id number]. She reports pain actually started in Feb 2019. This lasted for several days with associated nausea. Symptoms returned in April, with RUQ and \"gassy\" sensation in chest. She had previous ultrasound 5/7/19 for RUQ pain x 2 months which showed cholelithiasis with probable choledocholithiasis. She was seen By Dr Graciela Prince for evaluation for cholecystectomy 5/2019. There was no history of abnormal LFTs at that time. Cholecystectomy was deferred until pt had another episode of RUQ pain, etc. Recent EGD and colonoscopy with findings as above. Plan:     -Continue antibiotics  -Continue PPI  -Keep NPO  -ERCP with Dr Mary Wallace today  -Further recommendations pending procedure findings    GEORGE Dey    Patient is seen and examined in collaboration with Dr. Mary Wallace. Assessment and plan as per Dr. Mary Wallace. I have seen and examined the patient, and agree with above. She has pain and jaundice, and markedly dilated bile ducts. ERCP today. Will need cholecystectomy after bile duct is cleared.

## 2019-08-06 NOTE — ANESTHESIA POSTPROCEDURE EVALUATION
Procedure(s):  ENDOSCOPIC RETROGRADE CHOLANGIOPANCREATOGRAPHY (ERCP) / 634  ENDOSCOPIC SPHINCTEROTOMY  ENDOSCOPIC STONE EXTRACTION/BALLOON SWEEP  BILIARY STENT PLACEMENT. general    Anesthesia Post Evaluation      Multimodal analgesia: multimodal analgesia used between 6 hours prior to anesthesia start to PACU discharge  Patient location during evaluation: bedside  Patient participation: complete - patient participated  Level of consciousness: awake and alert  Pain management: adequate  Airway patency: patent  Anesthetic complications: no  Cardiovascular status: acceptable  Respiratory status: acceptable  Hydration status: acceptable  Comments: Patient doing well. Continue care on floor. Pt now back in NSR with rate in the 60's after being in A. Fib with RVR post op. Nurse on floor also aware of this event. Continue to monitor but currently stable. Post anesthesia nausea and vomiting:  controlled      Vitals Value Taken Time   /66 8/6/2019  3:56 PM   Temp 36.1 °C (97 °F) 8/6/2019  3:10 PM   Pulse 66 8/6/2019  4:00 PM   Resp 21 8/6/2019  4:00 PM   SpO2 92 % 8/6/2019  4:00 PM   Vitals shown include unvalidated device data.

## 2019-08-06 NOTE — PROGRESS NOTES
TRANSFER - IN REPORT:    Verbal report received from Beau Mejia RN(name) on Brandy Arch  being received from GI Lab(unit) for routine progression of care      Report consisted of patients Situation, Background, Assessment and   Recommendations(SBAR). Information from the following report(s) SBAR and Cardiac Rhythm NSR was reviewed with the receiving nurse. Opportunity for questions and clarification was provided. Assessment completed upon patients arrival to unit and care assumed.

## 2019-08-06 NOTE — PROGRESS NOTES
CM met with patient to discuss d/c plan. Patient alert/oriented to name, place and . Patient verified information no changes needed. Patient states she lives in a two level home with three steps to enter into the home. States she's independent with her ADL's and has no DME's in the home. Patient states she will be returning back with no needs identified. CM will continue to monitor. Care Management Interventions  PCP Verified by CM: Yes(Verona Cole PA-C)  Mode of Transport at Discharge:  Other (see comment)(Family)  Transition of Care Consult (CM Consult): Discharge Planning  Discharge Durable Medical Equipment: No  Physical Therapy Consult: No  Speech Therapy Consult: No  Current Support Network: Own Home, Lives Alone  Confirm Follow Up Transport: Family  Plan discussed with Pt/Family/Caregiver: Yes  Freedom of Choice Offered: Yes  [address] Provided?: No  Discharge Location  Discharge Placement: Home

## 2019-08-06 NOTE — PROGRESS NOTES
Pt complaint of pain controlled with PRN Dilaudid per MAR. Pt complaint of nausea relieved by PRN Zofran per MAR. Pt remains NPO and is scheduled for an ERCP today. Hourly rounds completed on patient. All needs are met. Bed locked in and in low position. Call light within reach. Will report to oncoming nurse at bedside.

## 2019-08-06 NOTE — OP NOTES
PROCEDURE: Endoscopic Retrograde Cholangiopancreatogram    DATE of PROCEDURE: 8/6/2019    INDICATION: obstructive jaundice, with CBD stones identified on ultrasound. POSTPROCEDURE DIAGNOSIS:  Large, 15 mm stone in the common hepatic duct     MEDICATIONS ADMINISTERED:  GETA    INSTRUMENT: [id number]    PROCEDURE:  After obtaining informed consent, the patient was placed in prone position on the fluoroscopy table. The patient was then sedated. The endoscope was passed under indirect technique to the oropharynx and gently passed into the esophagus. The endoscope was passed to the ampulla. Limited views of the stomach and duodenum were obtained. After the procedure, the patient was taken to the recovery area in stable condition. Ampulla: The ampulla was located in the expected position. The papilla was very small. Cannulation: Cannulation performed on first attempt with the short nose traction sphincterotome preloaded with a 0.025 inch guidewire which was used for deep cannulation of the biliary tree. Contrast was injected resulting in complete opacification. Cholangiogram:  Initial cholangiogram demonstrated a small caliber common bile duct, with estimated diameter of 8 mm. The cystic duct filled. In the common hepatic duct, there was a large, 16 mm round stone. The CHD was dilated to 17 mm. The intrahepatic bile ducts were moderately and diffusely dilated. Interventions:  A moderate biliary sphincterotomy was performed with ERBE. There was no post-sphincterotomy bleeding. Next, the tome was exchanged for the 9-12 mm biliary extraction balloon. Sweeps in the distal and mid-CBD were clear. The balloon was inflated above the large stone and attempts to extract the stone were unsuccessful due to the large diameter of the stone and narrow distal CBD. There were no devices available for lithotripsy or crushing. Next, one 10 Fr x 7 cm plastic biliary stent was placed in the bile duct.  The stent was deployed in good position, green bile was seen flowing through the stent. Estimated Blood Loss: 0 cc-min    ASSESSMENT:  1. Large stone in the common hepatic duct, unable to pass through the common bile duct   2. Placement of biliary stent which will soften the stone       PLAN:  1. Follow up with inpatient team   2. Follow LFTs  3. Repeat ERCP in 6 weeks for stone extraction   4.  Will discuss case and cholecystectomy with surgery       Johanne Lombardi MD

## 2019-08-07 VITALS
HEART RATE: 47 BPM | TEMPERATURE: 98.2 F | OXYGEN SATURATION: 94 % | SYSTOLIC BLOOD PRESSURE: 151 MMHG | RESPIRATION RATE: 18 BRPM | DIASTOLIC BLOOD PRESSURE: 71 MMHG

## 2019-08-07 PROBLEM — R00.1 BRADYCARDIA: Status: ACTIVE | Noted: 2019-08-07

## 2019-08-07 LAB
ALBUMIN SERPL-MCNC: 2.6 G/DL (ref 3.2–4.6)
ALBUMIN/GLOB SERPL: 0.9 {RATIO} (ref 1.2–3.5)
ALP SERPL-CCNC: 335 U/L (ref 50–136)
ALT SERPL-CCNC: 182 U/L (ref 12–65)
ANION GAP SERPL CALC-SCNC: 6 MMOL/L (ref 7–16)
AST SERPL-CCNC: 64 U/L (ref 15–37)
ATRIAL RATE: 46 BPM
BASOPHILS # BLD: 0 K/UL (ref 0–0.2)
BASOPHILS NFR BLD: 1 % (ref 0–2)
BILIRUB DIRECT SERPL-MCNC: 3.6 MG/DL
BILIRUB SERPL-MCNC: 5.2 MG/DL (ref 0.2–1.1)
BUN SERPL-MCNC: 12 MG/DL (ref 8–23)
CALCIUM SERPL-MCNC: 8.4 MG/DL (ref 8.3–10.4)
CALCULATED P AXIS, ECG09: 66 DEGREES
CALCULATED R AXIS, ECG10: -48 DEGREES
CALCULATED T AXIS, ECG11: -23 DEGREES
CHLORIDE SERPL-SCNC: 109 MMOL/L (ref 98–107)
CO2 SERPL-SCNC: 27 MMOL/L (ref 21–32)
CREAT SERPL-MCNC: 0.68 MG/DL (ref 0.6–1)
DIAGNOSIS, 93000: NORMAL
DIFFERENTIAL METHOD BLD: ABNORMAL
EOSINOPHIL # BLD: 0.1 K/UL (ref 0–0.8)
EOSINOPHIL NFR BLD: 2 % (ref 0.5–7.8)
ERYTHROCYTE [DISTWIDTH] IN BLOOD BY AUTOMATED COUNT: 14.1 % (ref 11.9–14.6)
GLOBULIN SER CALC-MCNC: 2.9 G/DL (ref 2.3–3.5)
GLUCOSE SERPL-MCNC: 75 MG/DL (ref 65–100)
HCT VFR BLD AUTO: 36.1 % (ref 35.8–46.3)
HGB BLD-MCNC: 11.7 G/DL (ref 11.7–15.4)
IMM GRANULOCYTES # BLD AUTO: 0 K/UL (ref 0–0.5)
IMM GRANULOCYTES NFR BLD AUTO: 1 % (ref 0–5)
LYMPHOCYTES # BLD: 1.5 K/UL (ref 0.5–4.6)
LYMPHOCYTES NFR BLD: 23 % (ref 13–44)
MAGNESIUM SERPL-MCNC: 2 MG/DL (ref 1.8–2.4)
MCH RBC QN AUTO: 28.9 PG (ref 26.1–32.9)
MCHC RBC AUTO-ENTMCNC: 32.4 G/DL (ref 31.4–35)
MCV RBC AUTO: 89.1 FL (ref 79.6–97.8)
MONOCYTES # BLD: 0.9 K/UL (ref 0.1–1.3)
MONOCYTES NFR BLD: 13 % (ref 4–12)
NEUTS SEG # BLD: 4.1 K/UL (ref 1.7–8.2)
NEUTS SEG NFR BLD: 61 % (ref 43–78)
NRBC # BLD: 0 K/UL (ref 0–0.2)
P-R INTERVAL, ECG05: 160 MS
PLATELET # BLD AUTO: 257 K/UL (ref 150–450)
PMV BLD AUTO: 10.9 FL (ref 9.4–12.3)
POTASSIUM SERPL-SCNC: 3.4 MMOL/L (ref 3.5–5.1)
PROT SERPL-MCNC: 5.5 G/DL (ref 6.3–8.2)
Q-T INTERVAL, ECG07: 452 MS
QRS DURATION, ECG06: 90 MS
QTC CALCULATION (BEZET), ECG08: 395 MS
RBC # BLD AUTO: 4.05 M/UL (ref 4.05–5.2)
SODIUM SERPL-SCNC: 142 MMOL/L (ref 136–145)
VENTRICULAR RATE, ECG03: 46 BPM
WBC # BLD AUTO: 6.7 K/UL (ref 4.3–11.1)

## 2019-08-07 PROCEDURE — 93005 ELECTROCARDIOGRAM TRACING: CPT | Performed by: NURSE PRACTITIONER

## 2019-08-07 PROCEDURE — 77030020263 HC SOL INJ SOD CL0.9% LFCR 1000ML

## 2019-08-07 PROCEDURE — 74011250636 HC RX REV CODE- 250/636: Performed by: INTERNAL MEDICINE

## 2019-08-07 PROCEDURE — 80076 HEPATIC FUNCTION PANEL: CPT

## 2019-08-07 PROCEDURE — C9113 INJ PANTOPRAZOLE SODIUM, VIA: HCPCS | Performed by: INTERNAL MEDICINE

## 2019-08-07 PROCEDURE — 85025 COMPLETE CBC W/AUTO DIFF WBC: CPT

## 2019-08-07 PROCEDURE — 83735 ASSAY OF MAGNESIUM: CPT

## 2019-08-07 PROCEDURE — 74011250637 HC RX REV CODE- 250/637: Performed by: NURSE PRACTITIONER

## 2019-08-07 PROCEDURE — 74011000258 HC RX REV CODE- 258: Performed by: INTERNAL MEDICINE

## 2019-08-07 PROCEDURE — 74011000250 HC RX REV CODE- 250: Performed by: INTERNAL MEDICINE

## 2019-08-07 PROCEDURE — 80048 BASIC METABOLIC PNL TOTAL CA: CPT

## 2019-08-07 PROCEDURE — 36415 COLL VENOUS BLD VENIPUNCTURE: CPT

## 2019-08-07 RX ORDER — POTASSIUM CHLORIDE 20 MEQ/1
20 TABLET, EXTENDED RELEASE ORAL DAILY
Qty: 7 TAB | Refills: 0 | Status: SHIPPED | OUTPATIENT
Start: 2019-08-07 | End: 2019-08-12 | Stop reason: SDUPTHER

## 2019-08-07 RX ORDER — POTASSIUM CHLORIDE 20 MEQ/1
40 TABLET, EXTENDED RELEASE ORAL
Status: COMPLETED | OUTPATIENT
Start: 2019-08-07 | End: 2019-08-07

## 2019-08-07 RX ADMIN — PANTOPRAZOLE SODIUM 40 MG: 40 INJECTION, POWDER, FOR SOLUTION INTRAVENOUS at 07:56

## 2019-08-07 RX ADMIN — Medication 10 ML: at 05:35

## 2019-08-07 RX ADMIN — MEROPENEM 500 MG: 500 INJECTION INTRAVENOUS at 02:14

## 2019-08-07 RX ADMIN — APIXABAN 5 MG: 2.5 TABLET, FILM COATED ORAL at 14:04

## 2019-08-07 RX ADMIN — POTASSIUM CHLORIDE 40 MEQ: 20 TABLET, EXTENDED RELEASE ORAL at 11:43

## 2019-08-07 RX ADMIN — MEROPENEM 500 MG: 500 INJECTION INTRAVENOUS at 10:57

## 2019-08-07 NOTE — DISCHARGE INSTRUCTIONS
Patient Education   Follow up with primary care, GI, and cardiology and discuss the events of this admission. Patient Education     DISCHARGE SUMMARY from Nurse    PATIENT INSTRUCTIONS:    After general anesthesia or intravenous sedation, for 24 hours or while taking prescription Narcotics:  Limit your activities  Do not drive and operate hazardous machinery  Do not make important personal or business decisions  Do  not drink alcoholic beverages  If you have not urinated within 8 hours after discharge, please contact your surgeon on call. Report the following to your surgeon:  Excessive pain, swelling, redness or odor of or around the surgical area  Temperature over 100.5  Nausea and vomiting lasting longer than 4 hours or if unable to take medications  Any signs of decreased circulation or nerve impairment to extremity: change in color, persistent  numbness, tingling, coldness or increase pain  Any questions    What to do at Home:  Recommended activity: {discharge activity:85543}, ***    If you experience any of the following symptoms ***, please follow up with ***. *  Please give a list of your current medications to your Primary Care Provider. *  Please update this list whenever your medications are discontinued, doses are      changed, or new medications (including over-the-counter products) are added. *  Please carry medication information at all times in case of emergency situations. These are general instructions for a healthy lifestyle:    No smoking/ No tobacco products/ Avoid exposure to second hand smoke  Surgeon General's Warning:  Quitting smoking now greatly reduces serious risk to your health.     Obesity, smoking, and sedentary lifestyle greatly increases your risk for illness    A healthy diet, regular physical exercise & weight monitoring are important for maintaining a healthy lifestyle    You may be retaining fluid if you have a history of heart failure or if you experience any of the following symptoms:  Weight gain of 3 pounds or more overnight or 5 pounds in a week, increased swelling in our hands or feet or shortness of breath while lying flat in bed. Please call your doctor as soon as you notice any of these symptoms; do not wait until your next office visit. The discharge information has been reviewed with the {PATIENT PARENT GUARDIAN:28113}. The {PATIENT PARENT GUARDIAN:30152} verbalized understanding. Discharge medications reviewed with the {Dishcarge meds reviewed CYFS:47581} and appropriate educational materials and side effects teaching were provided. ___________________________________________________________________________________________________________________________________     Learning About Gallstones  What are gallstones? Gallstones are stones that form in the gallbladder. The gallbladder is a small sac located just under the liver. It stores bile released by the liver. Bile helps you digest fats. Gallstones can be smaller than a grain of sand or as large as a golf ball. Gallstones form when cholesterol and other things found in bile make stones. They can also form if the gallbladder doesn't empty as it should. Gallstones can also form in the common bile duct or cystic duct. These tubes carry bile from the gallbladder and the liver to the small intestine. What happens when you have gallstones? Gallstones can cause many different problems, such as:  · A blockage in the common bile duct. · Inflammation or infection of the gallbladder (acute cholecystitis). This can happen when a gallstone blocks the cystic duct. · Inflammation or infection of the common bile duct (cholangitis). This can happen when gallstones get stuck in the common bile duct. In rare cases, this can damage the liver or spread infection. · Pancreatitis, an inflammation of the pancreas. What are the symptoms? · Most people who have gallstones don't have symptoms.   · When symptoms occur, they can include:  ? Pain in the pit of your stomach or in the upper right part of your belly. It may spread to your right upper back or shoulder blade area. ? Pain that may come and go or be steady. It may get worse when you eat. ? Fever and chills, if a gallstone is blocking a bile duct and causing an infection. ? Yellowing of your skin and the whites of your eyes. How can you prevent gallstones? There is no sure way to prevent gallstones. But you can reduce your risk of forming gallstones that can cause symptoms. · Stay at a healthy weight. If you need to lose weight, do so slowly and sensibly. · Eat regular, balanced meals. · Be active, and exercise regularly. How are gallstones treated? · If you don't have symptoms, you probably don't need treatment. · For mild symptoms, your doctor may have you take pain medicine and wait to see if the pain goes away. · For severe pain or infection, or if you have more than one gallstone attack, your doctor may suggest surgery to have your gallbladder removed. The body works fine without a gallbladder. Follow-up care is a key part of your treatment and safety. Be sure to make and go to all appointments, and call your doctor if you are having problems. It's also a good idea to know your test results and keep a list of the medicines you take. Where can you learn more? Go to http://manny-ginny.info/. Enter  in the search box to learn more about \"Learning About Gallstones. \"  Current as of: November 7, 2018  Content Version: 12.1  © 6968-0174 [employer]. Care instructions adapted under license by [employer] (which disclaims liability or warranty for this information). If you have questions about a medical condition or this instruction, always ask your healthcare professional. [address] any warranty or liability for your use of this information.             Bradycardia: Care Instructions  Your Care Instructions    Bradycardia is a slow heart rate. A slow heart rate can be normal and healthy. Or it could be a sign of a problem with the heart's electrical system. If your heart beats too slowly, it may not supply your body with enough blood. This can make you weak or dizzy. Or it may make you pass out. Bradycardia can be caused by many things. This includes medicine, certain medical conditions, and changes in the heart that are the result of aging. How bradycardia is treated depends on what is causing it. Treatments include treating another health problem, changing a medicine, and getting a pacemaker. Treatment also depends on the symptoms. If bradycardia doesn't cause symptoms, it may not be treated. You and your doctor can decide what treatment is right for you. Follow-up care is a key part of your treatment and safety. Be sure to make and go to all appointments, and call your doctor if you are having problems. It's also a good idea to know your test results and keep a list of the medicines you take. How can you care for yourself at home? · Take your medicines exactly as prescribed. Call your doctor if you think you are having a problem with your medicine. If your bradycardia is caused by another disease, your doctor will try to treat the disease. If it is caused by heart medicines, he or she will adjust your medicines. · Make lifestyle changes to improve your heart health. ? Eat a heart-healthy diet that includes vegetables, fruits, nuts, beans, lean meat, fish, and whole grains. Limit alcohol, sodium, and sugar. ? Get regular exercise. Try for 30 minutes on most days of the week. If you do not have other heart problems, you likely do not have limits on the type or level of activity that you can do. You may want to walk, swim, bike, or do other activities. Ask your doctor what level of exercise is safe for you.  ? Stay at a healthy weight.  Lose weight if you need to.  ? Do not smoke. If you need help quitting, talk to your doctor about stop-smoking programs and medicines. These can increase your chances of quitting for good. ? Manage other health problems such as high blood pressure, high cholesterol, and diabetes. · If you get a pacemaker, you will get information about it. When should you call for help? Call 911 anytime you think you may need emergency care. For example, call if:    · You have symptoms of sudden heart failure. These may include:  ? Severe trouble breathing. ? A fast or irregular heartbeat. ? Coughing up pink, foamy mucus. ? You passed out.     · You have symptoms of a stroke. These may include:  ? Sudden numbness, tingling, weakness, or loss of movement in your face, arm, or leg, especially on only one side of your body. ? Sudden vision changes. ? Sudden trouble speaking. ? Sudden confusion or trouble understanding simple statements. ? Sudden problems with walking or balance. ? A sudden, severe headache that is different from past headaches.    Call your doctor now or seek immediate medical care if:    · You have new or changed symptoms of heart failure, such as:  ? New or increased shortness of breath. ? New or worse swelling in your legs, ankles, or feet. ? Sudden weight gain, such as more than 2 to 3 pounds in a day or 5 pounds in a week. (Your doctor may suggest a different range of weight gain.)  ? Feeling dizzy or lightheaded or like you may faint. ? Feeling so tired or weak that you cannot do your usual activities. ? Not sleeping well. Shortness of breath wakes you at night. You need extra pillows to prop yourself up to breathe easier.    Watch closely for changes in your health, and be sure to contact your doctor if:    · You do not get better as expected. Where can you learn more? Go to http://manny-ginny.info/. Enter B519 in the search box to learn more about \"Bradycardia: Care Instructions. \"  Current as of: July 22, 2018  Content Version: 12.1  © 3676-0903 [employer]. Care instructions adapted under license by [employer] (which disclaims liability or warranty for this information). If you have questions about a medical condition or this instruction, always ask your healthcare professional. [address] any warranty or liability for your use of this information.

## 2019-08-07 NOTE — PROGRESS NOTES
Pt continues on Remote tele per order, NSR 71 at the start of the shift, Pt Alex down to as low as 40. Pt was asymptomatic. Hourly rounds completed on patient. All needs are met. No complaints at this time. Bed locked in and in low position. Call light within reach. Will report to oncoming nurse at bedside.

## 2019-08-07 NOTE — DISCHARGE SUMMARY
Hospitalist Discharge Summary     Admit Date:  2019  4:23 PM   Name:  Zana Rowe   Age:  68 y.o.  :  1950   MRN:  [id number]   PCP:  Walker Parsons PA-C  Treatment Team: Attending Provider: Radha Delacruz DO; Consulting Provider: Rosie Langford MD; Utilization Review: Yareli Snyder RN; Care Manager: Eleazar Medellin LMSW; Primary Nurse: Kunal Sheriff RN    Problem List for this Hospitalization:  Hospital Problems as of 2019 Date Reviewed: 2019          Codes Class Noted - Resolved POA    GERD (gastroesophageal reflux disease) ICD-10-CM: K21.9  ICD-9-CM: 530.81  2019 - Present Unknown        * (Principal) Choledocholithiasis ICD-10-CM: K80.50  ICD-9-CM: 574.50  2019 - Present Unknown                Admission HPI from 2019:    \"Review H&P for details of admission  [facility] Course:    Anne-Marie Farrell is a 69 yo female  with pmhx significant for GERD, gall stones,  and tobacco abuse who presented to the ER with nausea, vomiting, and midepigastric abdominal pain radiating to her back. ED workup notable for WBC 12.7K, , , Alk phos 575, lipase 224, and US showing cholelithiasis and choledocolithiasis. Patient was transferred from  to [facility] for ERCP. GI performed ERCP  and found large stone in the common hepatic duct, unable to pass through the common bile duct, placement of a biliary stent which will soften the stone. Patient to follow up as OP with ERCP in 6 weeks for stone extraction, then will discuss cholecystectomy with Dr. Stephany Gomez. Patient went into Afib RVR during procedure. Was given metoprolol and converted. Patient with episodes of bradycardia. Cardiology consulted. Started patient on eliquis and to follow up as OP. Patient's magnesium and potassium replaced during admission. To continue potassium supplements at discharge and follow up with PCP in 3-5 days with BMP and magnesium labs.                     Follow up instructions and discharge meds at bottom of this note. Plan was discussed with patient. All questions answered. Patient was stable at time of discharge. Diagnostic Imaging/Tests:   Xr Ercp / Ercb Combined    Result Date: 8/6/2019  ERCP HISTORY: Common bile duct stone 11 fluoroscopic images were submitted from an ERCP performed by Dr. Alexsandra Houston. COMPARISON: None. Correlation is made to the abdominal ultrasound one day earlier. FINDINGS: Calcifications within the right upper quadrant are present in addition to a prominent calcification at the level of the proximal common bile duct. There is mild dilatation of the opacified intrahepatic ducts. A papillotomy was performed with placement of a biliary stent with partial emptying of the biliary system. 9 minutes and 10 seconds of fluoroscopy was used during this exam.     IMPRESSION: Large proximal common bile duct stone with placement of biliary stent. Please refer to the procedural report for further description and detail. [address] [facility]    Result Date: 8/5/2019  RIGHT UPPER QUADRANT  ULTRASOUND INDICATION: Abdominal pain gallstones COMPARISON: 05/07/2019 Transabdominal imaging of the upper abdomen was performed. FINDINGS: -LIVER: 17 cm. Normal echogenicity. No masses. -BILE DUCTS: Moderate bile duct dilatation. CBD diameter = 17 mm. At least one stone in the common duct. -GALLBLADDER: Cholelithiasis. No significant gallbladder wall thickening. -PANCREAS: Normal. -RIGHT KIDNEY: 10.1 cm.  1 cm upper pole cyst.  No mass or hydronephrosis. -ABDOMINAL AORTA AND IVC: Normal in size. -ASCITES: No free fluid. IMPRESSION: Cholelithiasis and choledocholithiasis. Stable bile duct dilatation. Echocardiogram results:  No results found for this visit on 08/05/19.       All Micro Results     None          Labs: Results:       BMP, Mg, Phos Recent Labs     08/07/19  0647 08/06/19  1558 08/06/19  1016    142 143   K 3.4* 3.4* 3.5   * 108* 108*   CO2 27 27 26   AGAP 6* 7 9 BUN 12 12 12   CREA 0.68 0.76 0.79   CA 8.4 8.5 8.6   GLU 75 93 92   MG  --  1.8  --       CBC Recent Labs     08/07/19  0647 08/06/19  1016 08/05/19  1057   WBC 6.7 10.5 12.7*   RBC 4.05 4.64 5.81*   HGB 11.7 13.1 16.5*   HCT 36.1 40.7 49.5*    297 395   GRANS 61 79* 85*   LYMPH 23 9* 7*   EOS 2 0* 0*   MONOS 13* 10 8   BASOS 1 0 0   IG 1 1 0   ANEU 4.1 8.3* 10.7*   ABL 1.5 1.0 0.9   MIGUEL A 0.1 0.0 0.0   ABM 0.9 1.1 1.0   ABB 0.0 0.0 0.0   AIG 0.0 0.1 0.1      LFT Recent Labs     08/07/19  0647 08/06/19  1016 08/05/19  1057   SGOT 64* 102* 297*   * 272* 574*   * 425* 575*   TP 5.5* 6.2* 8.4*   ALB 2.6* 3.0* 4.2   GLOB 2.9 3.2 4.2*   AGRAT 0.9* 0.9* 1.0*      Cardiac Testing Lab Results   Component Value Date/Time    Troponin-I, Qt. <0.02 (L) 08/06/2019 03:58 PM    Troponin-I, Qt. <0.02 (L) 08/05/2019 10:57 AM      Coagulation Tests No results found for: PTP, INR, APTT   A1c No results found for: HBA1C, HGBE8, XPI9SGTT   Lipid Panel No results found for: CHOL, CHOLPOCT, CHOLX, CHLST, CHOLV, 282045, HDL, LDL, LDLC, DLDLP, 517755, VLDLC, VLDL, TGLX, TRIGL, TRIGP, TGLPOCT, CHHD, CHHDX   Thyroid Panel No results found for: TSH, T4, FT4, TT3, T3U, TSHEXT     Most Recent UA No results found for: COLOR, APPRN, REFSG, YAS, PROTU, GLUCU, KETU, BILU, BLDU, UROU, MINDY, LEUKU     Allergies   Allergen Reactions    Codeine Other (comments)     Stomach ache    Doxycycline Other (comments)     Stomach aches    Flagyl [Metronidazole] Itching    Penicillins Rash       There is no immunization history on file for this patient.     All Labs from Last 24 Hrs:  Recent Results (from the past 24 hour(s))   EKG, 12 LEAD, INITIAL    Collection Time: 08/06/19  3:30 PM   Result Value Ref Range    Ventricular Rate 137 BPM    Atrial Rate 131 BPM    QRS Duration 90 ms    Q-T Interval 274 ms    QTC Calculation (Bezet) 413 ms    Calculated R Axis -55 degrees    Calculated T Axis -173 degrees    Diagnosis       Atrial fibrillation with rapid ventricular response  Left anterior fascicular block  Nonspecific ST and T wave abnormality  Abnormal ECG  When compared with ECG of 05-AUG-2019 13:20,  Significant changes have occurred  Confirmed by Mars Bethea MD (), RENÉ ANSARI ([id number]) on 8/6/2019 9:10:58 PM     METABOLIC PANEL, BASIC    Collection Time: 08/06/19  3:58 PM   Result Value Ref Range    Sodium 142 136 - 145 mmol/L    Potassium 3.4 (L) 3.5 - 5.1 mmol/L    Chloride 108 (H) 98 - 107 mmol/L    CO2 27 21 - 32 mmol/L    Anion gap 7 7 - 16 mmol/L    Glucose 93 65 - 100 mg/dL    BUN 12 8 - 23 MG/DL    Creatinine 0.76 0.6 - 1.0 MG/DL    GFR est AA >60 >60 ml/min/1.73m2    GFR est non-AA >60 >60 ml/min/1.73m2    Calcium 8.5 8.3 - 10.4 MG/DL   MAGNESIUM    Collection Time: 08/06/19  3:58 PM   Result Value Ref Range    Magnesium 1.8 1.8 - 2.4 mg/dL   TROPONIN I    Collection Time: 08/06/19  3:58 PM   Result Value Ref Range    Troponin-I, Qt. <0.02 (L) 0.02 - 0.05 NG/ML   CBC WITH AUTOMATED DIFF    Collection Time: 08/07/19  6:47 AM   Result Value Ref Range    WBC 6.7 4.3 - 11.1 K/uL    RBC 4.05 4.05 - 5.2 M/uL    HGB 11.7 11.7 - 15.4 g/dL    HCT 36.1 35.8 - 46.3 %    MCV 89.1 79.6 - 97.8 FL    MCH 28.9 26.1 - 32.9 PG    MCHC 32.4 31.4 - 35.0 g/dL    RDW 14.1 11.9 - 14.6 %    PLATELET 361 189 - 435 K/uL    MPV 10.9 9.4 - 12.3 FL    ABSOLUTE NRBC 0.00 0.0 - 0.2 K/uL    DF AUTOMATED      NEUTROPHILS 61 43 - 78 %    LYMPHOCYTES 23 13 - 44 %    MONOCYTES 13 (H) 4.0 - 12.0 %    EOSINOPHILS 2 0.5 - 7.8 %    BASOPHILS 1 0.0 - 2.0 %    IMMATURE GRANULOCYTES 1 0.0 - 5.0 %    ABS. NEUTROPHILS 4.1 1.7 - 8.2 K/UL    ABS. LYMPHOCYTES 1.5 0.5 - 4.6 K/UL    ABS. MONOCYTES 0.9 0.1 - 1.3 K/UL    ABS. EOSINOPHILS 0.1 0.0 - 0.8 K/UL    ABS. BASOPHILS 0.0 0.0 - 0.2 K/UL    ABS. IMM.  GRANS. 0.0 0.0 - 0.5 K/UL   METABOLIC PANEL, BASIC    Collection Time: 08/07/19  6:47 AM   Result Value Ref Range    Sodium 142 136 - 145 mmol/L    Potassium 3.4 (L) 3.5 - 5.1 mmol/L    Chloride 109 (H) 98 - 107 mmol/L    CO2 27 21 - 32 mmol/L    Anion gap 6 (L) 7 - 16 mmol/L    Glucose 75 65 - 100 mg/dL    BUN 12 8 - 23 MG/DL    Creatinine 0.68 0.6 - 1.0 MG/DL    GFR est AA >60 >60 ml/min/1.73m2    GFR est non-AA >60 >60 ml/min/1.73m2    Calcium 8.4 8.3 - 10.4 MG/DL   HEPATIC FUNCTION PANEL    Collection Time: 08/07/19  6:47 AM   Result Value Ref Range    Protein, total 5.5 (L) 6.3 - 8.2 g/dL    Albumin 2.6 (L) 3.2 - 4.6 g/dL    Globulin 2.9 2.3 - 3.5 g/dL    A-G Ratio 0.9 (L) 1.2 - 3.5      Bilirubin, total 5.2 (H) 0.2 - 1.1 MG/DL    Bilirubin, direct 3.6 (H) <0.4 MG/DL    Alk. phosphatase 335 (H) 50 - 136 U/L    AST (SGOT) 64 (H) 15 - 37 U/L    ALT (SGPT) 182 (H) 12 - 65 U/L   EKG, 12 LEAD, INITIAL    Collection Time: 08/07/19 11:17 AM   Result Value Ref Range    Ventricular Rate 46 BPM    Atrial Rate 46 BPM    P-R Interval 160 ms    QRS Duration 90 ms    Q-T Interval 452 ms    QTC Calculation (Bezet) 395 ms    Calculated P Axis 66 degrees    Calculated R Axis -48 degrees    Calculated T Axis -23 degrees    Diagnosis       Sinus bradycardia  Left anterior fascicular block  Nonspecific ST and T wave abnormality  Abnormal ECG  When compared with ECG of 06-AUG-2019 15:30,  Sinus rhythm has replaced Atrial fibrillation  Vent.  rate has decreased BY  91 BPM  ST no longer depressed in Anterolateral leads  T wave inversion no longer evident in Lateral leads  Confirmed by Essie Cabrera MD (), RENÉ ANSARI ([id number]) on 8/7/2019 11:34:27 AM         Discharge Exam:  Patient Vitals for the past 24 hrs:   Temp Pulse Resp BP SpO2   08/07/19 1051 98.2 °F (36.8 °C) (!) 47 18 151/71 94 %   08/07/19 0717 98.4 °F (36.9 °C) 65 18 130/72 94 %   08/07/19 0335 98.6 °F (37 °C) 61 18 108/58 92 %   08/06/19 2238 98.5 °F (36.9 °C) 71 18 107/65 92 %   08/06/19 2001 98.4 °F (36.9 °C) 74 18 115/67 93 %   08/06/19 1634 -- 65 17 -- 94 %   08/06/19 1626 -- 69 20 -- 95 %   08/06/19 1611 97.2 °F (36.2 °C) 63 18 121/66 94 % 08/06/19 1610 -- 63 19 -- 94 %   08/06/19 1606 -- 72 18 128/64 95 %   08/06/19 1605 -- 71 20 -- 93 %   08/06/19 1601 -- 68 23 130/71 96 %   08/06/19 1556 -- (!) 118 20 114/66 92 %   08/06/19 1552 -- (!) 126 13 100/70 100 %   08/06/19 1547 -- (!) 106 18 107/75 99 %   08/06/19 1542 -- (!) 114 18 111/55 92 %   08/06/19 1535 -- (!) 145 17 116/67 95 %   08/06/19 1526 -- (!) 135 18 148/65 92 %   08/06/19 1521 -- (!) 121 18 129/74 94 %   08/06/19 1516 -- (!) 129 20 115/71 96 %   08/06/19 1511 -- (!) 126 17 101/66 96 %   08/06/19 1510 97 °F (36.1 °C) (!) 119 14 101/65 96 %   08/06/19 1506 -- (!) 111 18 101/66 96 %   08/06/19 1411 -- (!) 59 16 128/60 97 %   08/06/19 1350 -- 63 16 111/63 91 %     Oxygen Therapy  O2 Sat (%): 94 % (08/07/19 1051)  Pulse via Oximetry: 70 beats per minute (08/06/19 1634)  O2 Device: Nasal cannula (08/06/19 1611)  O2 Flow Rate (L/min): 4 l/min (08/06/19 1611)    Intake/Output Summary (Last 24 hours) at 8/7/2019 1146  Last data filed at 8/7/2019 0926  Gross per 24 hour   Intake 3363 ml   Output 0 ml   Net 3363 ml       General:    Well nourished. Alert. No distress. Eyes:   Normal sclera. Extraocular movements intact. ENT:  Normocephalic, atraumatic. Moist mucous membranes  CV:   Regular rate and rhythm. No murmur, rub, or gallop. Lungs:  Clear to auscultation bilaterally. No wheezing, rhonchi, or rales. Abdomen: Soft, nontender, nondistended. Bowel sounds normal.   Extremities: Warm and dry. No cyanosis or edema. Neurologic: CN II-XII grossly intact. Sensation intact. Skin:     No rashes or jaundice. Psych:  Normal mood and affect. Discharge Info:   Current Discharge Medication List      START taking these medications    Details   potassium chloride (K-DUR, KLOR-CON) 20 mEq tablet Take 1 Tab by mouth daily for 7 days.   Qty: 7 Tab, Refills: 0         CONTINUE these medications which have NOT CHANGED    Details   omeprazole (PRILOSEC) 20 mg capsule 1 capsule bid  Qty: 60 Cap, Refills: 2    Associated Diagnoses: Gastroesophageal reflux disease, esophagitis presence not specified      Biotin 2,500 mcg cap Take  by mouth. Indications: patient takes med every other day         STOP taking these medications       sucralfate (CARAFATE) 1 gram tablet Comments:   Reason for Stopping:                 Disposition: home    Activity: Activity as tolerated  Diet: DIET GI SOFT No options chosen    Follow-up Appointments   Procedures    FOLLOW UP VISIT Appointment in: 3 - 5 Days     Standing Status:   Standing     Number of Occurrences:   1     Order Specific Question:   Appointment in     Answer:   3 - 5 Days         Follow-up Information     Follow up With Specialties Details Why Contact Info    [facility]  On 8/28/2019 at 2:15 [facility] [facility]    Rebecca Parnell Physician Assistant In 3 days with bmp and magnesium [id number]  [phone]            Time spent in patient discharge planning and coordination 35 minutes.   Discharge plan discussed with Dr. Fred Hobbs, GI, and cardiology,   Signed:  ISAIAH Clements

## 2019-08-07 NOTE — PROGRESS NOTES
Call from [address] [place] [address] with Cardiology, she has seen pt, Eliquis ordered and follow up appts made. Per Maxine, inform Hospitalist pt may discharge.

## 2019-08-07 NOTE — PROGRESS NOTES
Patient to discharge home. No needs identified at this time. Care Management Interventions  PCP Verified by CM: Yes(Latanya Cole PA-C)  Mode of Transport at Discharge:  Other (see comment)(Family)  Transition of Care Consult (CM Consult): Discharge Planning  Discharge Durable Medical Equipment: No  Physical Therapy Consult: No  Speech Therapy Consult: No  Current Support Network: Own Home, Lives Alone  Confirm Follow Up Transport: Family  Plan discussed with Pt/Family/Caregiver: Yes  Freedom of Choice Offered: Yes  [employer] Provided?: No  Discharge Location  Discharge Placement: Home

## 2019-08-07 NOTE — PROGRESS NOTES
[facility] Progress Note         Admit Date:  8/5/2019    Today's Date:  8/7/2019    CC:  Choledocolithiasis    Subjective:     Patient: Feeling fatigued, but pain improved. Denies any N&V. Asking to be discharged. Also asking for food. Wants to continue omeprazole, but asking to stop sucralfate. PROCEDURE: Endoscopic Retrograde Cholangiopancreatogram     DATE of PROCEDURE: 8/6/2019     INDICATION: obstructive jaundice, with CBD stones identified on ultrasound.      POSTPROCEDURE DIAGNOSIS:  Large, 15 mm stone in the common hepatic duct      MEDICATIONS ADMINISTERED:  GETA     INSTRUMENT: [id number]     PROCEDURE:  After obtaining informed consent, the patient was placed in prone position on the fluoroscopy table. The patient was then sedated. The endoscope was passed under indirect technique to the oropharynx and gently passed into the esophagus. The endoscope was passed to the ampulla. Limited views of the stomach and duodenum were obtained. After the procedure, the patient was taken to the recovery area in stable condition.     Ampulla: The ampulla was located in the expected position. The papilla was very small.      Cannulation: Cannulation performed on first attempt with the short nose traction sphincterotome preloaded with a 0.025 inch guidewire which was used for deep cannulation of the biliary tree. Contrast was injected resulting in complete opacification.      Cholangiogram:  Initial cholangiogram demonstrated a small caliber common bile duct, with estimated diameter of 8 mm. The cystic duct filled. In the common hepatic duct, there was a large, 16 mm round stone. The CHD was dilated to 17 mm. The intrahepatic bile ducts were moderately and diffusely dilated.      Interventions:  A moderate biliary sphincterotomy was performed with ERBE. There was no post-sphincterotomy bleeding. Next, the tome was exchanged for the 9-12 mm biliary extraction balloon.  Sweeps in the distal and mid-CBD were clear. The balloon was inflated above the large stone and attempts to extract the stone were unsuccessful due to the large diameter of the stone and narrow distal CBD. There were no devices available for lithotripsy or crushing. Next, one 10 Fr x 7 cm plastic biliary stent was placed in the bile duct. The stent was deployed in good position, green bile was seen flowing through the stent.            Estimated Blood Loss: 0 cc-min     ASSESSMENT:  1. Large stone in the common hepatic duct, unable to pass through the common bile duct   2. Placement of biliary stent which will soften the stone         PLAN:  1. Follow up with inpatient team   2. Follow LFTs  3. Repeat ERCP in 6 weeks for stone extraction   4. Will discuss case and cholecystectomy with surgery         Carolyn Kulkarni MD    Medications:   Current Facility-Administered Medications   Medication Dose Route Frequency    nicotine (NICODERM CQ) 14 mg/24 hr patch 1 Patch  1 Patch TransDERmal Q24H    sodium chloride (NS) flush 5-40 mL  5-40 mL IntraVENous Q8H    sodium chloride (NS) flush 5-40 mL  5-40 mL IntraVENous PRN    HYDROmorphone (PF) (DILAUDID) injection 0.5 mg  0.5 mg IntraVENous Q4H PRN    ondansetron (ZOFRAN) injection 4 mg  4 mg IntraVENous Q4H PRN    0.9% sodium chloride infusion  125 mL/hr IntraVENous CONTINUOUS    pantoprazole (PROTONIX) 40 mg in sodium chloride 0.9% 10 mL injection  40 mg IntraVENous DAILY    meropenem (MERREM) 500 mg in 0.9% sodium chloride (MBP/ADV) 50 mL  0.5 g IntraVENous Q8H       Review of Systems:  ROS was obtained, with pertinent positives as listed above. No chest pain or SOB. Diet:  Clear liquid diet    Objective:   Vitals:  Visit Vitals  /72 (BP 1 Location: Right arm, BP Patient Position: At rest)   Pulse 65   Temp 98.4 °F (36.9 °C)   Resp 18   SpO2 94%     Intake/Output:  08/07 0701 - 08/07 1900  In: 2794 [P.O.:240;  I.V.:2063]  Out: -   08/05 1901 - 08/07 0700  In: 1060 [P.O.:60; I.V.:1000]  Out: 0   Exam:  General appearance: alert, cooperative, no distress jaundice; sitting up in bed  Lungs: clear to auscultation bilaterally anteriorly  Heart: regular rate and rhythm  Abdomen: soft, non-tender. Bowel sounds normal. No masses, no organomegaly  Extremities: extremities normal, atraumatic, no cyanosis or edema  Neuro:  alert and oriented    Data Review (Labs):    Recent Labs     08/07/19  0647 08/06/19  1558 08/06/19  1016 08/05/19  1057   WBC 6.7  --  10.5 12.7*   HGB 11.7  --  13.1 16.5*   HCT 36.1  --  40.7 49.5*     --  297 395   MCV 89.1  --  87.7 85.2   NA  --  142 143 142   K  --  3.4* 3.5 3.8   CL  --  108* 108* 105   CO2  --  27 26 27   BUN  --  12 12 14   CREA  --  0.76 0.79 0.92   CA  --  8.5 8.6 10.3   MG  --  1.8  --   --    GLU  --  93 92 133*   AP  --   --  425* 575*   SGOT  --   --  102* 297*   ALT  --   --  272* 574*   TBILI  --   --  7.6* 7.7*   ALB  --   --  3.0* 4.2   TP  --   --  6.2* 8.4*   LPSE  --   --   --  224       Assessment:     Principal Problem:    Choledocholithiasis (8/5/2019)    Active Problems:    GERD (gastroesophageal reflux disease) (8/5/2019)    68 y.o. female with PMH of GERD, who we are following for choledocholithiasis. ED evaluation with WBC 12.7, T bili 7.7, , , , lipase 224. Ultrasound 8/5/19 with moderate bile duct dilation, at least one stone in CBD, cholelithiasis. ERCP on 8/6/19 by Dr. Daniel Payne revealed a large stone in the common hepatic duct, unable to pass through the common bile duct. Placement of biliary stent which will soften the stone. Plan: 1. Await results of LFTS. If trending down, may be discharged home  2. Advance diet  3. She will need repeat ERCP with biliary stent removal by Dr. Iqra Martinez in 6 weeks. We will make her a follow up in the office. 4.cholecystectomy with Dr. Antoinette Wood once stone extraction complete. 5.Trend LFTS  6. May go home on omeprazole, but does not need sucralfate    Royden Bars. Karlos Driscoll in collaboration with Dr. Fred Roblero  [facility]

## 2019-08-07 NOTE — PROGRESS NOTES
Return call from [facility], informed call from Tele x 2 HR 44. Also informed potassium 3.4. No new orders received.

## 2019-08-07 NOTE — CONSULTS
[facility] Cardiology Consult                Date of  Admission: 8/5/2019  4:23 PM     Primary Care Physician: Dr. Figueroa [place]  Primary Cardiologist: none (would like to see Dr. Dhiraj Davis if needed)  Referring Physician: Bessy Zapien NP  Consulting Physician: Dr. Francia Cranker    CC/Reason for consult: AFIB w RVR, bradycardia      Juan Thorpe is a 68 y.o. female with past medical history of GERD, choledocholithiasis and smoker who presented to the ER with complaints of vomiting and severe abdominal pain. Ultrasound was done that showed cholelithiasis and choledocholothiasis. She was admitted by the hospitalist for continued care. GI was consulted as well. Patient underwent ERCP with stent placement on 8/6/19 by Dr. Hank Davila. Post-procedure, patient was noted to be in AFIB w RVR. She was given 5mg IV lopressor and placed on telemetry. She has remained in SR since that time with some episodes of bradycardia noted (rates as low as 40). She is not on rate slowing medication at this time. We have been asked to see patient in consultation for bradycardia management. Patient denies palpitations, fatigue with exertion, shortness of breath or chest pain. Denies significant family history of CAD/CVA or rhythm issues. Admits to smoking 1 ppd for the past ~40 years.      Patient Active Problem List   Diagnosis Code    GERD (gastroesophageal reflux disease) K21.9    Choledocholithiasis K80.50       Past Medical History:   Diagnosis Date    GERD (gastroesophageal reflux disease)     Smoker     1 ppd x 50 years      Past Surgical History:   Procedure Laterality Date    COLONOSCOPY N/A 7/10/2019    COLONOSCOPY performed by Antonia Villanueva MD at [address] [place] [address] FLX DX W/COLLJ SPEC WHEN PFRMD  7/10/2019         [place] EGD TRANSORAL BIOPSY SINGLE/MULTIPLE  7/10/2019          Allergies   Allergen Reactions    Codeine Other (comments)     Stomach ache    Doxycycline Other (comments)     Stomach aches    Flagyl [Metronidazole] Itching    Penicillins Rash      Family History   Problem Relation Age of Onset    Cancer Mother     COPD Mother     No Known Problems Father         Current Facility-Administered Medications   Medication Dose Route Frequency    nicotine (NICODERM CQ) 14 mg/24 hr patch 1 Patch  1 Patch TransDERmal Q24H    sodium chloride (NS) flush 5-40 mL  5-40 mL IntraVENous Q8H    sodium chloride (NS) flush 5-40 mL  5-40 mL IntraVENous PRN    HYDROmorphone (PF) (DILAUDID) injection 0.5 mg  0.5 mg IntraVENous Q4H PRN    ondansetron (ZOFRAN) injection 4 mg  4 mg IntraVENous Q4H PRN    0.9% sodium chloride infusion  125 mL/hr IntraVENous CONTINUOUS    pantoprazole (PROTONIX) 40 mg in sodium chloride 0.9% 10 mL injection  40 mg IntraVENous DAILY    meropenem (MERREM) 500 mg in 0.9% sodium chloride (MBP/ADV) 50 mL  0.5 g IntraVENous Q8H       Review of Systems   Constitutional: Positive for malaise/fatigue. HENT: Negative. Eyes: Negative. Respiratory: Negative. Cardiovascular: Negative. Gastrointestinal: Positive for abdominal pain, nausea and vomiting. Genitourinary: Negative. Musculoskeletal: Negative. Skin: Negative. Neurological: Negative. Endo/Heme/Allergies: Negative. Psychiatric/Behavioral: Negative. Physical Exam  Vitals:    08/06/19 2238 08/07/19 0335 08/07/19 0717 08/07/19 1051   BP: 107/65 108/58 130/72 151/71   Pulse: 71 61 65 (!) 47   Resp: 18 18 18 18   Temp: 98.5 °F (36.9 °C) 98.6 °F (37 °C) 98.4 °F (36.9 °C) 98.2 °F (36.8 °C)   SpO2: 92% 92% 94% 94%       Physical Exam:  Physical Exam   Constitutional: She is oriented to person, place, and time and well-developed, well-nourished, and in no distress. Cardiovascular: Regular rhythm. Bradycardia present. Pulmonary/Chest: Effort normal and breath sounds normal.   Abdominal: Soft. Bowel sounds are normal.   Musculoskeletal: She exhibits no edema.    Neurological: She is alert and oriented to person, place, and time. Skin: Skin is warm and dry. Psychiatric: Affect normal.       Cardiographics    Telemetry: sinus bradycardia  ECG: atrial fibrillation with RVR, sinus bradycardia  Echocardiogram: ordered as outpatient    Labs:   Recent Labs     08/07/19  0647 08/06/19  1558 08/06/19  1016    142 143   K 3.4* 3.4* 3.5   MG 2.0 1.8  --    BUN 12 12 12   CREA 0.68 0.76 0.79   GLU 75 93 92   WBC 6.7  --  10.5   HGB 11.7  --  13.1   HCT 36.1  --  40.7     --  297        Assessment/Plan:     Assessment:        Choledocholithiasis -- per primary and GI post ERCP with stent placement      GERD (gastroesophageal reflux disease) -- per primary team      Atrial fibrillation with RVR -- given 5mg IV lopressor with conversion back to SB. CHADsVAc score is 2 given patient a 2.9% risk of a CVA. Will start Eliquis 5mg BID with first dose now. Check echocardiogram in the office on 8/8/19 at 10:30 am with follow-up with Dr. Justina Salgado on 8/20/19 at 10:30am in the [place] office. Bradycardia -- asymptomatic. Unable to start rate slowing medications. See above. Tobacco abuse -- current everyday smoker. Dispo: Patient is OK to discharge from cardiac standpoint. Follow-ups made. Thank you very much for this referral. We appreciate the opportunity to participate in this patient's care. We will follow along with above stated plan.     Alexia Dillard NP  Consulting MD: Jari Sever

## 2019-08-20 PROBLEM — I48.0 PAF (PAROXYSMAL ATRIAL FIBRILLATION) (HCC): Status: ACTIVE | Noted: 2019-08-20

## 2019-09-07 ENCOUNTER — HOSPITAL ENCOUNTER (INPATIENT)
Age: 69
LOS: 2 days | Discharge: HOME | DRG: 872 | End: 2019-09-09
Attending: EMERGENCY MEDICINE | Admitting: INTERNAL MEDICINE
Payer: MEDICARE

## 2019-09-07 ENCOUNTER — ANESTHESIA (OUTPATIENT)
Dept: ENDOSCOPY | Age: 69
DRG: 872 | End: 2019-09-07
Payer: MEDICARE

## 2019-09-07 ENCOUNTER — APPOINTMENT (OUTPATIENT)
Dept: GENERAL RADIOLOGY | Age: 69
DRG: 872 | End: 2019-09-07
Attending: EMERGENCY MEDICINE
Payer: MEDICARE

## 2019-09-07 ENCOUNTER — ANESTHESIA EVENT (OUTPATIENT)
Dept: ENDOSCOPY | Age: 69
DRG: 872 | End: 2019-09-07
Payer: MEDICARE

## 2019-09-07 DIAGNOSIS — R10.11 RUQ PAIN: Primary | ICD-10-CM

## 2019-09-07 DIAGNOSIS — K80.33 ACUTE CHOLANGITIS DUE TO CALCULUS OF BILE DUCT WITH OBSTRUCTION: ICD-10-CM

## 2019-09-07 DIAGNOSIS — R79.89 ELEVATED LFTS: ICD-10-CM

## 2019-09-07 PROBLEM — D72.829 LEUKOCYTOSIS: Status: ACTIVE | Noted: 2019-09-07

## 2019-09-07 PROBLEM — F17.200 SMOKER: Status: ACTIVE | Noted: 2019-09-07

## 2019-09-07 PROBLEM — K80.31 CHOLANGITIS DUE TO BILE DUCT CALCULUS WITH OBSTRUCTION: Status: ACTIVE | Noted: 2019-09-07

## 2019-09-07 PROBLEM — A41.9 SEPSIS (HCC): Status: ACTIVE | Noted: 2019-09-07

## 2019-09-07 LAB
ALBUMIN SERPL-MCNC: 3.9 G/DL (ref 3.2–4.6)
ALBUMIN/GLOB SERPL: 1 {RATIO} (ref 1.2–3.5)
ALP SERPL-CCNC: 453 U/L (ref 50–136)
ALT SERPL-CCNC: 297 U/L (ref 12–65)
ANION GAP SERPL CALC-SCNC: 6 MMOL/L (ref 7–16)
AST SERPL-CCNC: 288 U/L (ref 15–37)
BASOPHILS # BLD: 0.1 K/UL (ref 0–0.2)
BASOPHILS NFR BLD: 0 % (ref 0–2)
BILIRUB SERPL-MCNC: 2.4 MG/DL (ref 0.2–1.1)
BUN SERPL-MCNC: 13 MG/DL (ref 8–23)
CALCIUM SERPL-MCNC: 9.3 MG/DL (ref 8.3–10.4)
CHLORIDE SERPL-SCNC: 105 MMOL/L (ref 98–107)
CO2 SERPL-SCNC: 28 MMOL/L (ref 21–32)
CREAT SERPL-MCNC: 1.07 MG/DL (ref 0.6–1)
DIFFERENTIAL METHOD BLD: ABNORMAL
EOSINOPHIL # BLD: 0.1 K/UL (ref 0–0.8)
EOSINOPHIL NFR BLD: 0 % (ref 0.5–7.8)
ERYTHROCYTE [DISTWIDTH] IN BLOOD BY AUTOMATED COUNT: 14.1 % (ref 11.9–14.6)
GLOBULIN SER CALC-MCNC: 4 G/DL (ref 2.3–3.5)
GLUCOSE BLD STRIP.AUTO-MCNC: 144 MG/DL (ref 65–100)
GLUCOSE SERPL-MCNC: 170 MG/DL (ref 65–100)
HCT VFR BLD AUTO: 48.3 % (ref 35.8–46.3)
HGB BLD-MCNC: 15.9 G/DL (ref 11.7–15.4)
IMM GRANULOCYTES # BLD AUTO: 0.1 K/UL (ref 0–0.5)
IMM GRANULOCYTES NFR BLD AUTO: 0 % (ref 0–5)
LACTATE BLD-SCNC: 1.18 MMOL/L (ref 0.5–1.9)
LIPASE SERPL-CCNC: 252 U/L (ref 73–393)
LYMPHOCYTES # BLD: 0.8 K/UL (ref 0.5–4.6)
LYMPHOCYTES NFR BLD: 5 % (ref 13–44)
MCH RBC QN AUTO: 29.1 PG (ref 26.1–32.9)
MCHC RBC AUTO-ENTMCNC: 32.9 G/DL (ref 31.4–35)
MCV RBC AUTO: 88.3 FL (ref 79.6–97.8)
MONOCYTES # BLD: 0.5 K/UL (ref 0.1–1.3)
MONOCYTES NFR BLD: 3 % (ref 4–12)
NEUTS SEG # BLD: 16.6 K/UL (ref 1.7–8.2)
NEUTS SEG NFR BLD: 92 % (ref 43–78)
NRBC # BLD: 0 K/UL (ref 0–0.2)
PLATELET # BLD AUTO: 372 K/UL (ref 150–450)
PMV BLD AUTO: 10.5 FL (ref 9.4–12.3)
POTASSIUM SERPL-SCNC: 4 MMOL/L (ref 3.5–5.1)
PROT SERPL-MCNC: 7.9 G/DL (ref 6.3–8.2)
RBC # BLD AUTO: 5.47 M/UL (ref 4.05–5.2)
SODIUM SERPL-SCNC: 139 MMOL/L (ref 136–145)
WBC # BLD AUTO: 18.1 K/UL (ref 4.3–11.1)

## 2019-09-07 PROCEDURE — 65270000029 HC RM PRIVATE

## 2019-09-07 PROCEDURE — C1769 GUIDE WIRE: HCPCS | Performed by: INTERNAL MEDICINE

## 2019-09-07 PROCEDURE — 74011250637 HC RX REV CODE- 250/637: Performed by: INTERNAL MEDICINE

## 2019-09-07 PROCEDURE — 85025 COMPLETE CBC W/AUTO DIFF WBC: CPT

## 2019-09-07 PROCEDURE — 87186 SC STD MICRODIL/AGAR DIL: CPT

## 2019-09-07 PROCEDURE — 74011250636 HC RX REV CODE- 250/636: Performed by: ANESTHESIOLOGY

## 2019-09-07 PROCEDURE — 83605 ASSAY OF LACTIC ACID: CPT

## 2019-09-07 PROCEDURE — 87040 BLOOD CULTURE FOR BACTERIA: CPT

## 2019-09-07 PROCEDURE — 99283 EMERGENCY DEPT VISIT LOW MDM: CPT | Performed by: EMERGENCY MEDICINE

## 2019-09-07 PROCEDURE — 87205 SMEAR GRAM STAIN: CPT

## 2019-09-07 PROCEDURE — 77030039425 HC BLD LARYNG TRULITE DISP TELE -A: Performed by: ANESTHESIOLOGY

## 2019-09-07 PROCEDURE — 74011250636 HC RX REV CODE- 250/636: Performed by: INTERNAL MEDICINE

## 2019-09-07 PROCEDURE — 76060000035 HC ANESTHESIA 2 TO 2.5 HR: Performed by: INTERNAL MEDICINE

## 2019-09-07 PROCEDURE — BF100ZZ FLUOROSCOPY OF BILE DUCTS USING HIGH OSMOLAR CONTRAST: ICD-10-PCS | Performed by: INTERNAL MEDICINE

## 2019-09-07 PROCEDURE — 77030040361 HC SLV COMPR DVT MDII -B: Performed by: INTERNAL MEDICINE

## 2019-09-07 PROCEDURE — 74330 X-RAY BILE/PANC ENDOSCOPY: CPT

## 2019-09-07 PROCEDURE — 74011000250 HC RX REV CODE- 250: Performed by: INTERNAL MEDICINE

## 2019-09-07 PROCEDURE — 83690 ASSAY OF LIPASE: CPT

## 2019-09-07 PROCEDURE — 77030006969 HC BSKT BILI RTVR BSC -D: Performed by: INTERNAL MEDICINE

## 2019-09-07 PROCEDURE — 77030037088 HC TUBE ENDOTRACH ORAL NSL COVD-A: Performed by: ANESTHESIOLOGY

## 2019-09-07 PROCEDURE — 74011000258 HC RX REV CODE- 258: Performed by: EMERGENCY MEDICINE

## 2019-09-07 PROCEDURE — 87077 CULTURE AEROBIC IDENTIFY: CPT

## 2019-09-07 PROCEDURE — 0FC98ZZ EXTIRPATION OF MATTER FROM COMMON BILE DUCT, VIA NATURAL OR ARTIFICIAL OPENING ENDOSCOPIC: ICD-10-PCS | Performed by: INTERNAL MEDICINE

## 2019-09-07 PROCEDURE — 93005 ELECTROCARDIOGRAM TRACING: CPT | Performed by: INTERNAL MEDICINE

## 2019-09-07 PROCEDURE — 87150 DNA/RNA AMPLIFIED PROBE: CPT

## 2019-09-07 PROCEDURE — 96375 TX/PRO/DX INJ NEW DRUG ADDON: CPT | Performed by: EMERGENCY MEDICINE

## 2019-09-07 PROCEDURE — 76040000028: Performed by: INTERNAL MEDICINE

## 2019-09-07 PROCEDURE — 80053 COMPREHEN METABOLIC PANEL: CPT

## 2019-09-07 PROCEDURE — 74011250636 HC RX REV CODE- 250/636: Performed by: EMERGENCY MEDICINE

## 2019-09-07 PROCEDURE — 74011000250 HC RX REV CODE- 250

## 2019-09-07 PROCEDURE — 82962 GLUCOSE BLOOD TEST: CPT

## 2019-09-07 PROCEDURE — 96365 THER/PROPH/DIAG IV INF INIT: CPT | Performed by: EMERGENCY MEDICINE

## 2019-09-07 PROCEDURE — 74011250636 HC RX REV CODE- 250/636

## 2019-09-07 PROCEDURE — C2625 STENT, NON-COR, TEM W/DEL SY: HCPCS | Performed by: INTERNAL MEDICINE

## 2019-09-07 PROCEDURE — 77030007288 HC DEV LOK BILI BSC -A: Performed by: INTERNAL MEDICINE

## 2019-09-07 PROCEDURE — 96376 TX/PRO/DX INJ SAME DRUG ADON: CPT | Performed by: EMERGENCY MEDICINE

## 2019-09-07 PROCEDURE — 77030013991 HC SNR POLYP ENDOSC BSC -A: Performed by: INTERNAL MEDICINE

## 2019-09-07 PROCEDURE — 77030009038 HC CATH BILI STN RTVR BSC -C: Performed by: INTERNAL MEDICINE

## 2019-09-07 DEVICE — BILIARY STENT WITH RX DELIVERY SYSTEM
Type: IMPLANTABLE DEVICE | Site: BILE DUCT | Status: FUNCTIONAL
Brand: RX BILIARY

## 2019-09-07 RX ORDER — MORPHINE SULFATE 4 MG/ML
4 INJECTION, SOLUTION INTRAMUSCULAR; INTRAVENOUS ONCE
Status: DISCONTINUED | OUTPATIENT
Start: 2019-09-07 | End: 2019-09-07

## 2019-09-07 RX ORDER — LEVOFLOXACIN 5 MG/ML
750 INJECTION, SOLUTION INTRAVENOUS
Status: DISCONTINUED | OUTPATIENT
Start: 2019-09-07 | End: 2019-09-08

## 2019-09-07 RX ORDER — SODIUM CHLORIDE 9 MG/ML
125 INJECTION, SOLUTION INTRAVENOUS CONTINUOUS
Status: ACTIVE | OUTPATIENT
Start: 2019-09-07 | End: 2019-09-07

## 2019-09-07 RX ORDER — MORPHINE SULFATE 2 MG/ML
2 INJECTION, SOLUTION INTRAMUSCULAR; INTRAVENOUS
Status: DISCONTINUED | OUTPATIENT
Start: 2019-09-07 | End: 2019-09-07

## 2019-09-07 RX ORDER — DEXAMETHASONE SODIUM PHOSPHATE 4 MG/ML
INJECTION, SOLUTION INTRA-ARTICULAR; INTRALESIONAL; INTRAMUSCULAR; INTRAVENOUS; SOFT TISSUE AS NEEDED
Status: DISCONTINUED | OUTPATIENT
Start: 2019-09-07 | End: 2019-09-07 | Stop reason: HOSPADM

## 2019-09-07 RX ORDER — SODIUM CHLORIDE 0.9 % (FLUSH) 0.9 %
5-40 SYRINGE (ML) INJECTION EVERY 8 HOURS
Status: DISCONTINUED | OUTPATIENT
Start: 2019-09-07 | End: 2019-09-09 | Stop reason: HOSPADM

## 2019-09-07 RX ORDER — SODIUM CHLORIDE 9 MG/ML
100 INJECTION, SOLUTION INTRAVENOUS CONTINUOUS
Status: DISCONTINUED | OUTPATIENT
Start: 2019-09-07 | End: 2019-09-07

## 2019-09-07 RX ORDER — PROMETHAZINE HYDROCHLORIDE 25 MG/ML
INJECTION, SOLUTION INTRAMUSCULAR; INTRAVENOUS
Status: DISCONTINUED
Start: 2019-09-07 | End: 2019-09-08

## 2019-09-07 RX ORDER — SODIUM CHLORIDE 9 MG/ML
500 INJECTION, SOLUTION INTRAVENOUS ONCE
Status: COMPLETED | OUTPATIENT
Start: 2019-09-07 | End: 2019-09-07

## 2019-09-07 RX ORDER — SUCCINYLCHOLINE CHLORIDE 20 MG/ML
INJECTION INTRAMUSCULAR; INTRAVENOUS AS NEEDED
Status: DISCONTINUED | OUTPATIENT
Start: 2019-09-07 | End: 2019-09-07 | Stop reason: HOSPADM

## 2019-09-07 RX ORDER — FACIAL-BODY WIPES
10 EACH TOPICAL DAILY PRN
Status: DISCONTINUED | OUTPATIENT
Start: 2019-09-07 | End: 2019-09-09 | Stop reason: HOSPADM

## 2019-09-07 RX ORDER — HYDROMORPHONE HYDROCHLORIDE 1 MG/ML
1 INJECTION, SOLUTION INTRAMUSCULAR; INTRAVENOUS; SUBCUTANEOUS
Status: COMPLETED | OUTPATIENT
Start: 2019-09-07 | End: 2019-09-07

## 2019-09-07 RX ORDER — ROCURONIUM BROMIDE 10 MG/ML
INJECTION, SOLUTION INTRAVENOUS AS NEEDED
Status: DISCONTINUED | OUTPATIENT
Start: 2019-09-07 | End: 2019-09-07 | Stop reason: HOSPADM

## 2019-09-07 RX ORDER — IBUPROFEN 200 MG
1 TABLET ORAL EVERY 24 HOURS
Status: DISCONTINUED | OUTPATIENT
Start: 2019-09-07 | End: 2019-09-09 | Stop reason: HOSPADM

## 2019-09-07 RX ORDER — LEVOFLOXACIN 5 MG/ML
500 INJECTION, SOLUTION INTRAVENOUS EVERY 24 HOURS
Status: DISCONTINUED | OUTPATIENT
Start: 2019-09-07 | End: 2019-09-07 | Stop reason: DRUGHIGH

## 2019-09-07 RX ORDER — METRONIDAZOLE 500 MG/100ML
500 INJECTION, SOLUTION INTRAVENOUS EVERY 12 HOURS
Status: DISCONTINUED | OUTPATIENT
Start: 2019-09-07 | End: 2019-09-07

## 2019-09-07 RX ORDER — HYDROMORPHONE HYDROCHLORIDE 1 MG/ML
0.5 INJECTION, SOLUTION INTRAMUSCULAR; INTRAVENOUS; SUBCUTANEOUS ONCE
Status: COMPLETED | OUTPATIENT
Start: 2019-09-07 | End: 2019-09-07

## 2019-09-07 RX ORDER — ONDANSETRON 2 MG/ML
INJECTION INTRAMUSCULAR; INTRAVENOUS AS NEEDED
Status: DISCONTINUED | OUTPATIENT
Start: 2019-09-07 | End: 2019-09-07 | Stop reason: HOSPADM

## 2019-09-07 RX ORDER — LIDOCAINE HYDROCHLORIDE 20 MG/ML
INJECTION, SOLUTION EPIDURAL; INFILTRATION; INTRACAUDAL; PERINEURAL AS NEEDED
Status: DISCONTINUED | OUTPATIENT
Start: 2019-09-07 | End: 2019-09-07 | Stop reason: HOSPADM

## 2019-09-07 RX ORDER — HYDROMORPHONE HYDROCHLORIDE 1 MG/ML
1 INJECTION, SOLUTION INTRAMUSCULAR; INTRAVENOUS; SUBCUTANEOUS
Status: DISCONTINUED | OUTPATIENT
Start: 2019-09-07 | End: 2019-09-09 | Stop reason: HOSPADM

## 2019-09-07 RX ORDER — METRONIDAZOLE 500 MG/100ML
500 INJECTION, SOLUTION INTRAVENOUS EVERY 8 HOURS
Status: DISCONTINUED | OUTPATIENT
Start: 2019-09-07 | End: 2019-09-07

## 2019-09-07 RX ORDER — DIPHENHYDRAMINE HYDROCHLORIDE 50 MG/ML
6.25 INJECTION, SOLUTION INTRAMUSCULAR; INTRAVENOUS
Status: ACTIVE | OUTPATIENT
Start: 2019-09-07 | End: 2019-09-07

## 2019-09-07 RX ORDER — HYDROMORPHONE HYDROCHLORIDE 1 MG/ML
1 INJECTION, SOLUTION INTRAMUSCULAR; INTRAVENOUS; SUBCUTANEOUS
Status: DISCONTINUED | OUTPATIENT
Start: 2019-09-07 | End: 2019-09-07 | Stop reason: SDUPTHER

## 2019-09-07 RX ORDER — SODIUM CHLORIDE 0.9 % (FLUSH) 0.9 %
5-40 SYRINGE (ML) INJECTION AS NEEDED
Status: DISCONTINUED | OUTPATIENT
Start: 2019-09-07 | End: 2019-09-09 | Stop reason: HOSPADM

## 2019-09-07 RX ORDER — SODIUM CHLORIDE, SODIUM LACTATE, POTASSIUM CHLORIDE, CALCIUM CHLORIDE 600; 310; 30; 20 MG/100ML; MG/100ML; MG/100ML; MG/100ML
1000 INJECTION, SOLUTION INTRAVENOUS CONTINUOUS
Status: DISCONTINUED | OUTPATIENT
Start: 2019-09-07 | End: 2019-09-07 | Stop reason: HOSPADM

## 2019-09-07 RX ORDER — ONDANSETRON 2 MG/ML
4 INJECTION INTRAMUSCULAR; INTRAVENOUS
Status: COMPLETED | OUTPATIENT
Start: 2019-09-07 | End: 2019-09-07

## 2019-09-07 RX ORDER — METRONIDAZOLE 500 MG/100ML
500 INJECTION, SOLUTION INTRAVENOUS ONCE
Status: ACTIVE | OUTPATIENT
Start: 2019-09-07 | End: 2019-09-07

## 2019-09-07 RX ORDER — PROPOFOL 10 MG/ML
INJECTION, EMULSION INTRAVENOUS AS NEEDED
Status: DISCONTINUED | OUTPATIENT
Start: 2019-09-07 | End: 2019-09-07 | Stop reason: HOSPADM

## 2019-09-07 RX ORDER — NALOXONE HYDROCHLORIDE 0.4 MG/ML
0.4 INJECTION, SOLUTION INTRAMUSCULAR; INTRAVENOUS; SUBCUTANEOUS AS NEEDED
Status: DISCONTINUED | OUTPATIENT
Start: 2019-09-07 | End: 2019-09-09 | Stop reason: HOSPADM

## 2019-09-07 RX ORDER — ONDANSETRON 2 MG/ML
4 INJECTION INTRAMUSCULAR; INTRAVENOUS
Status: DISCONTINUED | OUTPATIENT
Start: 2019-09-07 | End: 2019-09-09 | Stop reason: HOSPADM

## 2019-09-07 RX ADMIN — HYDROMORPHONE HYDROCHLORIDE 1 MG: 1 INJECTION, SOLUTION INTRAMUSCULAR; INTRAVENOUS; SUBCUTANEOUS at 07:29

## 2019-09-07 RX ADMIN — HYDROMORPHONE HYDROCHLORIDE 0.5 MG: 1 INJECTION, SOLUTION INTRAMUSCULAR; INTRAVENOUS; SUBCUTANEOUS at 12:19

## 2019-09-07 RX ADMIN — ONDANSETRON 4 MG: 2 INJECTION INTRAMUSCULAR; INTRAVENOUS at 12:58

## 2019-09-07 RX ADMIN — PROPOFOL 130 MG: 10 INJECTION, EMULSION INTRAVENOUS at 13:02

## 2019-09-07 RX ADMIN — DEXAMETHASONE SODIUM PHOSPHATE 4 MG: 4 INJECTION, SOLUTION INTRA-ARTICULAR; INTRALESIONAL; INTRAMUSCULAR; INTRAVENOUS; SOFT TISSUE at 13:16

## 2019-09-07 RX ADMIN — SODIUM CHLORIDE, SODIUM LACTATE, POTASSIUM CHLORIDE, AND CALCIUM CHLORIDE: 600; 310; 30; 20 INJECTION, SOLUTION INTRAVENOUS at 14:32

## 2019-09-07 RX ADMIN — PROPOFOL 50 MG: 10 INJECTION, EMULSION INTRAVENOUS at 13:57

## 2019-09-07 RX ADMIN — SODIUM CHLORIDE 500 ML: 900 INJECTION, SOLUTION INTRAVENOUS at 07:28

## 2019-09-07 RX ADMIN — SODIUM CHLORIDE, SODIUM LACTATE, POTASSIUM CHLORIDE, AND CALCIUM CHLORIDE 1000 ML: 600; 310; 30; 20 INJECTION, SOLUTION INTRAVENOUS at 12:54

## 2019-09-07 RX ADMIN — FAMOTIDINE 20 MG: 10 INJECTION, SOLUTION INTRAVENOUS at 11:12

## 2019-09-07 RX ADMIN — PROPOFOL 30 MG: 10 INJECTION, EMULSION INTRAVENOUS at 14:28

## 2019-09-07 RX ADMIN — HYDROMORPHONE HYDROCHLORIDE 1 MG: 1 INJECTION, SOLUTION INTRAMUSCULAR; INTRAVENOUS; SUBCUTANEOUS at 09:30

## 2019-09-07 RX ADMIN — ROCURONIUM BROMIDE 5 MG: 10 INJECTION, SOLUTION INTRAVENOUS at 13:02

## 2019-09-07 RX ADMIN — PROMETHAZINE HYDROCHLORIDE 3 MG: 25 INJECTION INTRAMUSCULAR; INTRAVENOUS at 15:23

## 2019-09-07 RX ADMIN — CEFTRIAXONE 2 G: 2 INJECTION, POWDER, FOR SOLUTION INTRAMUSCULAR; INTRAVENOUS at 08:43

## 2019-09-07 RX ADMIN — ONDANSETRON 4 MG: 2 INJECTION INTRAMUSCULAR; INTRAVENOUS at 15:08

## 2019-09-07 RX ADMIN — LIDOCAINE HYDROCHLORIDE 60 MG: 20 INJECTION, SOLUTION EPIDURAL; INFILTRATION; INTRACAUDAL; PERINEURAL at 13:02

## 2019-09-07 RX ADMIN — SODIUM CHLORIDE 125 ML/HR: 900 INJECTION, SOLUTION INTRAVENOUS at 08:43

## 2019-09-07 RX ADMIN — SUCCINYLCHOLINE CHLORIDE 110 MG: 20 INJECTION INTRAMUSCULAR; INTRAVENOUS at 13:02

## 2019-09-07 RX ADMIN — LEVOFLOXACIN 750 MG: 5 INJECTION, SOLUTION INTRAVENOUS at 10:31

## 2019-09-07 RX ADMIN — ONDANSETRON 4 MG: 2 INJECTION INTRAMUSCULAR; INTRAVENOUS at 07:29

## 2019-09-07 RX ADMIN — SODIUM CHLORIDE, SODIUM LACTATE, POTASSIUM CHLORIDE, AND CALCIUM CHLORIDE: 600; 310; 30; 20 INJECTION, SOLUTION INTRAVENOUS at 12:58

## 2019-09-07 NOTE — CONSULTS
[facility] Consult Note       Primary GI Physician: Dr. Hank Davila    Referring Provider:  Dr. Ladan Avalos- ER    Consult Date:  9/7/2019    Admit Date:  9/7/2019    Chief Complaint:  Elevated LFT, Abdominal pain, Choledocholithiasis    Subjective:     History of Present Illness:  Patient is a 68 y.o. female with PMH of Afib on Xarelto, Bradycardia, Choledocholithiasis s/p bilary stent placement 8/6/19 with Dr. Hank Davila , who is seen in consultation at the request of Dr. Ladan Avalos for elevated LFT, abdominal pain, choledocholithiasis. Mr. Gopal Matias was admitted from 8/5/19 until 8/7/19 for the same. We were consulted during that admission and performed ERCP on 8/6/19 with findings of large 15mm stone in the Common hepatic duct which could not be retrieved. Therefore a 10Fr x 7cm plastic biliary stent was placed in the bile duct. She followed up in our office on 8/28/19 with Roosevelt Wylie NP and her LFT were almost normalized and her pain had resolved. However, in between her follow-up with us and discharge, she had a readmission for Afib with RVR and bradycardia and was started on Xarelto by Cardiology. Today, Mrs. Gopal Matias returned to the ER with complaints of increasing abdominal pain in the epigastric region radiating to her back for the past 3 days. The pain is now a 10/10. She reports chills but has been afebrile. She has had nausea and vomiting with the onset of pain. Reports dark colored urine as well over the past 1-2 days. Labs with TB 2.4, , , , lipase 252, WBC 18.1. In the ER, she has been started on Rocephin and Flagyl. Blood cultures have been ordered as well as lactic acid. She denies any recent changes in bowel pattern. Reports having a normal formed brown stool yesterday. Last dose of Xarelto was yesterday evening 9/6/19.      PMH:  Past Medical History:   Diagnosis Date    GERD (gastroesophageal reflux disease)     Smoker     1 ppd x 50 years       PSH:  Past Surgical History:   Procedure Laterality Date    COLONOSCOPY N/A 7/10/2019    COLONOSCOPY performed by Gareth Delacruz MD at [facility] HX GI  08/2019    ERCP     [place] COLONOSCOPY FLX DX W/COLLJ SPEC WHEN PFRMD  7/10/2019         [place] EGD TRANSORAL BIOPSY SINGLE/MULTIPLE  7/10/2019            Allergies: Allergies   Allergen Reactions    Codeine Other (comments)     Stomach ache    Doxycycline Other (comments)     Stomach aches    Flagyl [Metronidazole] Itching    Penicillins Rash    Potassium Nausea Only       Home Medications:  Prior to Admission medications    Medication Sig Start Date End Date Taking? Authorizing Provider   rivaroxaban (XARELTO) 20 mg tab tablet Take 1 Tab by mouth daily (with dinner). 9/5/19   Antoine Kevin MD   omeprazole (PRILOSEC) 20 mg capsule 1 capsule bid 9/5/19   Keyona Craig DO   raNITIdine (ZANTAC) 150 mg tablet Take 150 mg by mouth two (2) times a day. Provider, Historical   potassium chloride (K-DUR, KLOR-CON) 20 mEq tablet TAKE 1 TABLET BY MOUTH DAILY 8/13/19   Jaren OSBORNE PA-C   Biotin 2,500 mcg cap Take  by mouth. Indications: patient takes med every other day    Provider, Historical       Hospital Medications:  Current Facility-Administered Medications   Medication Dose Route Frequency    0.9% sodium chloride infusion  125 mL/hr IntraVENous CONTINUOUS    cefTRIAXone (ROCEPHIN) 2 g in 0.9% sodium chloride (MBP/ADV) 50 mL  2 g IntraVENous ONCE    metroNIDAZOLE (FLAGYL) IVPB premix 500 mg  500 mg IntraVENous ONCE    diphenhydrAMINE (BENADRYL) injection 6.5 mg  6.5 mg IntraVENous NOW     Current Outpatient Medications   Medication Sig    rivaroxaban (XARELTO) 20 mg tab tablet Take 1 Tab by mouth daily (with dinner).  omeprazole (PRILOSEC) 20 mg capsule 1 capsule bid    raNITIdine (ZANTAC) 150 mg tablet Take 150 mg by mouth two (2) times a day.     potassium chloride (K-DUR, KLOR-CON) 20 mEq tablet TAKE 1 TABLET BY MOUTH DAILY    Biotin 2,500 mcg cap Take  by mouth. Indications: patient takes med every other day       Social History:  Social History     Tobacco Use    Smoking status: Current Every Day Smoker     Packs/day: 1.00     Years: 50.00     Pack years: 50.00    Smokeless tobacco: Never Used   Substance Use Topics    Alcohol use: Not Currently           Family History:  Family History   Problem Relation Age of Onset   Camila Lighter Cancer Mother     COPD Mother     No Known Problems Father        Review of Systems:  A detailed 10 system ROS is obtained, with pertinent positives as listed above. All others are negative. Diet:  NPO    Objective:     Physical Exam:  Vitals:  Visit Vitals  /74 (BP 1 Location: Right arm, BP Patient Position: At rest)   Pulse (!) 109   Temp 97.8 °F (36.6 °C)   Resp 20   Ht 5' 2\" (1.575 m)   Wt 57.6 kg (127 lb)   SpO2 95%   BMI 23.23 kg/m²     Gen:  Pt is alert, cooperative, holding her abdomen and grimacing  Skin:  Extremities and face reveal no rashes. Jaundiced. HEENT: Sclerae anicteric. Extra-occular muscles are intact. No oral ulcers. No abnormal pigmentation of the lips. The neck is supple. Cardiovascular: Regular rate and rhythm. No murmurs, gallops, or rubs. Respiratory:  Comfortable breathing with no accessory muscle use. Clear breath sounds anteriorly with no wheezes, rales, or rhonchi. GI:  Abdomen nondistended, soft, and Tender to minimal palpation with guarding. Normal active bowel sounds. No enlargement of the liver or spleen. No masses palpable. Rectal:  Deferred  Musculoskeletal:  No pitting edema of the lower legs. Neurological:  Gross memory appears intact. Patient is alert and oriented. Psychiatric:  Mood appears appropriate with judgement intact. Lymphatic:  No cervical or supraclavicular adenopathy.     Laboratory:    Recent Labs     09/07/19  0710   WBC 18.1*   HGB 15.9*   HCT 48.3*      MCV 88.3      K 4.0      CO2 28   BUN 13   CREA 1.07*   CA 9.3   GLU 170*   *   SGOT 288*   *   TBILI 2.4*   ALB 3.9   TP 7.9   LPSE 252        TTE 8/8/2019:   TTE (8/8/19): normal EF, mild LAE  -------------------------------------------    RIGHT UPPER QUADRANT  ULTRASOUND 8/5/2019     INDICATION: Abdominal pain gallstones     COMPARISON: 05/07/2019     Transabdominal imaging of the upper abdomen was performed.     FINDINGS:   -LIVER: 17 cm. Normal echogenicity. No masses. -BILE DUCTS: Moderate bile duct dilatation. CBD diameter = 17 mm. At least one  stone in the common duct. -GALLBLADDER: Cholelithiasis. No significant gallbladder wall thickening. -PANCREAS: Normal.     -RIGHT KIDNEY: 10.1 cm.  1 cm upper pole cyst.  No mass or hydronephrosis. -ABDOMINAL AORTA AND IVC: Normal in size. -ASCITES: No free fluid.     IMPRESSION  IMPRESSION: Cholelithiasis and choledocholithiasis. Stable bile duct  dilatation. Assessment:     1. Abdominal Pain  2. Elevated LFT: TB 2.4, , , , lipase 252, WBC 18.1.  3. Leukocytosis likely secondary to #7  4. Choledocholithiasis s/p biliary stent placement of [id number] plastic biliary stent in the bile duct. 5. Post op Afib after ERCP now on Xarelto- last dose 9/6/19 evening. 6. Hx of Bradycardia- was on 2 week Zio patch per Cardiology starting 8/20/19.  7. Cholangitis   Plan:     1. ERCP with stent exchange with Dr. Shira Gonzales. Risk of procedure discussed with patient to include risk of infection, bleeding, perforation, sedation reaction, exacerbation of underlying medical conditions, and death. She voiced understanding and wishes to plan for procedure. 2. Antibiotics  3. Pain and nausea control per primary  4. Will need to hold Xarelto  5. Hospital medicine to admit due to her recent Cardiac history  6. Blood cultures pending    Kinjal Kyle NP      PT SEEN AND EXAMINED AND PLAN DISCUSSED AND IMPLEMENTED.   Jodi Gonzalez MD

## 2019-09-07 NOTE — ED TRIAGE NOTES
Reports having gallbladder pain and \"spasms. \"  States has been vomiting. States this episode began around 44.  was seen a month ago for same but initially began in February.  has a gallstone in the hepatic duct.

## 2019-09-07 NOTE — ED NOTES
[facility] TRANSFER - OUT REPORT:    Verbal report given to david pizarro on Dhaval Tavarez  being transferred to 6th floor for routine progression of care       Report consisted of patients Situation, Background, Assessment and   Recommendations(SBAR). Information from the following report(s) SBAR, ED Summary and MAR was reviewed with the receiving nurse. Lines:   Peripheral IV 09/07/19 Left Antecubital (Active)       Peripheral IV 09/07/19 Right Antecubital (Active)   Site Assessment Clean, dry, & intact 9/7/2019 11:48 AM   Phlebitis Assessment 0 9/7/2019 11:48 AM   Infiltration Assessment 0 9/7/2019 11:48 AM   Dressing Status Clean, dry, & intact 9/7/2019 11:48 AM   Hub Color/Line Status Pink 9/7/2019 11:48 AM        Opportunity for questions and clarification was provided.

## 2019-09-07 NOTE — PROGRESS NOTES
09/07/19 1615   Dual Skin Pressure Injury Assessment   Dual Skin Pressure Injury Assessment WDL   Second Care Provider (Based on [address] [place] [address]) Johann Brown RN   Skin Integumentary   Skin Integumentary (WDL) WDL   Wound Prevention and Protection Methods   Orientation of Wound Prevention Posterior   Location of Wound Prevention Sacrum/Coccyx   Dressing Present  No   Wound Offloading (Prevention Methods) Bed, pressure reduction mattress;Repositioning;Turning   To room from PACU Drowsy but oriented x 4. Denies nausea or pain. No bleeding noted. No skin break down noted. Dual skin assessment completed with Johann Brown RN. Oriented to room and floor.

## 2019-09-07 NOTE — ED PROVIDER NOTES
[facility] Emergency Department  Arrival Date/Time: 9/7/2019  7:08 AM      Jose Angel Benz  MRN: [id number]    YOB: 1950   68 y.o. female    [facility] EMERGENCY DEPT ER12/12  Seen on 9/7/2019 @ 7:22 AM      Today's Chief Complaint:   Chief Complaint   Patient presents with    Abdominal Pain     HPI: 75-year-old female presents to the emergency department with upper abdominal pain primarily in the right upper quadrant    Placed last month by Dr. Katherin Aase    Try to retrieve her stone but were unable to during MRCP    Had been doing well but having increased pain for the last day. Associated with nausea worse pain with eating. HPI    Review of Systems: Review of Systems   Constitutional: Positive for appetite change. Negative for activity change and chills. Respiratory: Negative. Cardiovascular: Negative. Gastrointestinal: Positive for abdominal pain, nausea and vomiting. Genitourinary: Negative. Musculoskeletal: Negative. Skin: Negative. Neurological: Negative. Psychiatric/Behavioral: Negative. Past Medical History: Primary Care Doctor: Tye Daugherty PA-C  Meds, PMH, PSHx, SocHx at end of this note     Allergies: Allergies   Allergen Reactions    Codeine Other (comments)     Stomach ache    Doxycycline Other (comments)     Stomach aches    Flagyl [Metronidazole] Itching    Penicillins Rash    Potassium Nausea Only         Key Anti-Platelet Anticoagulant Meds             rivaroxaban (XARELTO) 20 mg tab tablet Take 1 Tab by mouth daily (with dinner). Physical Exam:  Nursing documentation reviewed. Vitals:    09/07/19 0708   BP: 182/74   Pulse: (!) 109   Resp: 20   Temp: 97.8 °F (36.6 °C)   SpO2: 95%    Vital signs were reviewed. Physical Exam   Constitutional: She is oriented to person, place, and time. She appears ill. She appears distressed. HENT:   Head: Normocephalic and atraumatic.    Mouth/Throat: Oropharynx is clear and moist. Eyes: Pupils are equal, round, and reactive to light. EOM are normal. Scleral icterus is present. Cardiovascular: Normal rate and regular rhythm. Pulmonary/Chest: Effort normal and breath sounds normal.   Abdominal: She exhibits no distension and no ascites. There is tenderness in the right upper quadrant and epigastric area. Neurological: She is alert and oriented to person, place, and time. Skin: Skin is warm and dry. Capillary refill takes less than 2 seconds. Psychiatric: She has a normal mood and affect. Her behavior is normal.   Nursing note and vitals reviewed. MEDICAL DECISION MAKING:   Differential Diagnosis:    MDM  Number of Diagnoses or Management Options  Diagnosis management comments: 68-year-old female with biliary stent presents to the emergency department with increased abdominal pain    Check labs  Discuss with GI       Amount and/or Complexity of Data Reviewed  Clinical lab tests: ordered    Risk of Complications, Morbidity, and/or Mortality  Presenting problems: moderate  Diagnostic procedures: minimal  Management options: high          Data:      Lab findings during this visit (only abnormal values will be noted, if no value noted then the result   was normal range):   Recent Results (from the past 48 hour(s))   CBC WITH AUTOMATED DIFF    Collection Time: 09/07/19  7:10 AM   Result Value Ref Range    WBC 18.1 (H) 4.3 - 11.1 K/uL    RBC 5.47 (H) 4.05 - 5.2 M/uL    HGB 15.9 (H) 11.7 - 15.4 g/dL    HCT 48.3 (H) 35.8 - 46.3 %    MCV 88.3 79.6 - 97.8 FL    MCH 29.1 26.1 - 32.9 PG    MCHC 32.9 31.4 - 35.0 g/dL    RDW 14.1 11.9 - 14.6 %    PLATELET 314 809 - 980 K/uL    MPV 10.5 9.4 - 12.3 FL    ABSOLUTE NRBC 0.00 0.0 - 0.2 K/uL    DF AUTOMATED      NEUTROPHILS 92 (H) 43 - 78 %    LYMPHOCYTES 5 (L) 13 - 44 %    MONOCYTES 3 (L) 4.0 - 12.0 %    EOSINOPHILS 0 (L) 0.5 - 7.8 %    BASOPHILS 0 0.0 - 2.0 %    IMMATURE GRANULOCYTES 0 0.0 - 5.0 %    ABS.  NEUTROPHILS 16.6 (H) 1.7 - 8.2 K/UL ABS. LYMPHOCYTES 0.8 0.5 - 4.6 K/UL    ABS. MONOCYTES 0.5 0.1 - 1.3 K/UL    ABS. EOSINOPHILS 0.1 0.0 - 0.8 K/UL    ABS. BASOPHILS 0.1 0.0 - 0.2 K/UL    ABS. IMM. GRANS. 0.1 0.0 - 0.5 K/UL   METABOLIC PANEL, COMPREHENSIVE    Collection Time: 09/07/19  7:10 AM   Result Value Ref Range    Sodium 139 136 - 145 mmol/L    Potassium 4.0 3.5 - 5.1 mmol/L    Chloride 105 98 - 107 mmol/L    CO2 28 21 - 32 mmol/L    Anion gap 6 (L) 7 - 16 mmol/L    Glucose 170 (H) 65 - 100 mg/dL    BUN 13 8 - 23 MG/DL    Creatinine 1.07 (H) 0.6 - 1.0 MG/DL    GFR est AA >60 >60 ml/min/1.73m2    GFR est non-AA 54 (L) >60 ml/min/1.73m2    Calcium 9.3 8.3 - 10.4 MG/DL    Bilirubin, total 2.4 (H) 0.2 - 1.1 MG/DL    ALT (SGPT) 297 (H) 12 - 65 U/L    AST (SGOT) 288 (H) 15 - 37 U/L    Alk. phosphatase 453 (H) 50 - 136 U/L    Protein, total 7.9 6.3 - 8.2 g/dL    Albumin 3.9 3.2 - 4.6 g/dL    Globulin 4.0 (H) 2.3 - 3.5 g/dL    A-G Ratio 1.0 (L) 1.2 - 3.5     LIPASE    Collection Time: 09/07/19  7:10 AM   Result Value Ref Range    Lipase 252 73 - 393 U/L   POC LACTIC ACID    Collection Time: 09/07/19  8:32 AM   Result Value Ref Range    Lactic Acid (POC) 1.18 0.5 - 1.9 mmol/L   ]      Radiology studies during this visit: No results found.     Medications given in the ED:   Medications   0.9% sodium chloride infusion (125 mL/hr IntraVENous New Bag 9/7/19 0868)   metroNIDAZOLE (FLAGYL) IVPB premix 500 mg (500 mg IntraVENous Refused 9/7/19 0845)   diphenhydrAMINE (BENADRYL) injection 6.5 mg (has no administration in time range)   ondansetron (ZOFRAN) injection 4 mg (4 mg IntraVENous Given 9/7/19 0729)   0.9% sodium chloride infusion 500 mL (500 mL IntraVENous New Bag 9/7/19 0728)   HYDROmorphone (PF) (DILAUDID) injection 1 mg (1 mg IntraVENous Given 9/7/19 0729)   cefTRIAXone (ROCEPHIN) 2 g in 0.9% sodium chloride (MBP/ADV) 50 mL (2 g IntraVENous New Bag 9/7/19 0843)       Recheck and Additional Documentation (Sepsis, Stroke, Hip):   Recheck -- pain improved  No fever  Labs reviewed    Added blood cx and abx  Consulted GI -- > hospitalist to admit  Procedure Documentation: Procedures     Assessment and Plan:      Impression:     ICD-10-CM ICD-9-CM   1. RUQ pain R10.11 789.01   2. Elevated LFTs R94.5 790.6   3. Acute cholangitis due to calculus of bile duct with obstruction K80.33 576.1     574.51       Disposition: Admitted    Follow-up:   Follow-up Information    None         Discharge Medications:   Current Discharge Medication List          Yaa Wallace MD; 19  7:22 AM    Other ED Course Notes:     ED Course as of Sep 07 0920   Sat Sep 07, 2019   0825 Sodium: 139 [GH]   0920 WBC(!): 18.1 [GH]   0920 Lactic Acid (POC): 1.18 [GH]   0920 Lipase: 252 [GH]   0920 ALT (SGPT)(!): 297 [GH]   0920 AST(!): 288 [GH]   0920 Alk. phosphatase(!): 453 [GH]   0920 Bilirubin, total(!): 2.4 [GH]      ED Course User Index  [GH] Apple Watson MD       Past Medical History:      Past Medical History:   Diagnosis Date    GERD (gastroesophageal reflux disease)     Smoker     1 ppd x 50 years     Past Surgical History:   Procedure Laterality Date    COLONOSCOPY N/A 7/10/2019    COLONOSCOPY performed by Debi Fletcher MD at [address] [place] [address] GI  2019    ERCP     [place] COLONOSCOPY FLX DX W/COLLJ SPEC WHEN PFRMD  7/10/2019         [place] EGD TRANSORAL BIOPSY SINGLE/MULTIPLE  7/10/2019          Social History     Tobacco Use    Smoking status: Current Every Day Smoker     Packs/day: 1.00     Years: 50.00     Pack years: 50.00    Smokeless tobacco: Never Used   Substance Use Topics    Alcohol use: Not Currently    Drug use: Never     Home Medication:   Prior to Admission Medications   Prescriptions Last Dose Informant Patient Reported? Taking? Biotin 2,500 mcg cap   Yes No   Sig: Take  by mouth.  Indications: patient takes med every other day   omeprazole (PRILOSEC) 20 mg capsule   No No   Si capsule bid   potassium chloride (K-DUR, KLOR-CON) 20 mEq tablet   No No   Sig: TAKE 1 TABLET BY MOUTH DAILY   raNITIdine (ZANTAC) 150 mg tablet   Yes No   Sig: Take 150 mg by mouth two (2) times a day. rivaroxaban (XARELTO) 20 mg tab tablet   No No   Sig: Take 1 Tab by mouth daily (with dinner).       Facility-Administered Medications: None

## 2019-09-07 NOTE — PROCEDURES
ENDOSCOPIC  RETROGRADE CHOLANGIOPANCREATOGRAPHY    DATE of PROCEDURE: 9/7/2019    PT NAME: Mariza Medrano     [id number]    MEDICATION: general;     INSTRUMENT:  [id number] VF    SPECIAL PROCEDURE: stent removal; balloon sweep; basket with mechanical lithotripsy; stone extraction; 8.5 fr / 10 cm biliary stent  BLOOD LOSS- 0 to min. SPEC- no  IMPLANT- none    PROCEDURE: standard procedure w/o complications    ASSESSMENT:  1. Large CHD stone with large amt of impacted stones and sludge- able to fracture and remove a large amt of stones and sludge but distal CHD was not dilated and could not clear the duct so a stent was replaced  2. Pancreas not evaluated    3. GB not seen but cystic duct was normal- low takeoff    PLAN:  1. inpt follow up  2. Don't think laser will help  3.  Consider Hurricane dilation in 3 weeks to facilitate clearance    VALERIE Jackman MD

## 2019-09-07 NOTE — PROGRESS NOTES
Resting quietly in bed without c/o nausea or pain. Hourly rounds completed, all needs met this shift. Will continue to monitor until night shift nurse takes over.

## 2019-09-07 NOTE — PROGRESS NOTES
TRANSFER - IN REPORT:    Verbal report received from Mickie RN(name) on Edra Gess  being received from ED(unit) for routine progression of care      Report consisted of patients Situation, Background, Assessment and   Recommendations(SBAR). Information from the following report(s) SBAR, Kardex and ED Summary was reviewed with the receiving nurse. Opportunity for questions and clarification was provided. Assessment completed upon patients arrival to unit and care assumed.  Not on unit To Go for ERCP before coming to floor

## 2019-09-07 NOTE — ANESTHESIA PREPROCEDURE EVALUATION
Relevant Problems   No relevant active problems       Anesthetic History   No history of anesthetic complications            Review of Systems / Medical History  Patient summary reviewed and pertinent labs reviewed    Pulmonary    COPD: moderate      Smoker (1 ppd x 50 years)         Neuro/Psych   Within defined limits           Cardiovascular            Dysrhythmias : atrial fibrillation      Exercise tolerance: >4 METS  Comments: Denies CP, SOB or changes in functional status   GI/Hepatic/Renal     GERD: well controlled           Endo/Other  Within defined limits           Other Findings   Comments: Bile duct obstruction with elevated LFTs  Had A fib during last ERCP         Physical Exam    Airway  Mallampati: II  TM Distance: 4 - 6 cm  Neck ROM: normal range of motion   Mouth opening: Normal     Cardiovascular  Regular rate and rhythm,  S1 and S2 normal,  no murmur, click, rub, or gallop  Rhythm: regular  Rate: normal         Dental    Dentition: Bridges     Pulmonary  Breath sounds clear to auscultation               Abdominal  GI exam deferred       Other Findings            Anesthetic Plan    ASA: 3, emergent  Anesthesia type: general          Induction: Intravenous  Anesthetic plan and risks discussed with: Patient      Consider b blocker to help prevent A fib

## 2019-09-07 NOTE — ED NOTES
Dr. Yogi Tolentino gave report to GI lab while this RN was with another patient. Pt to go to GI lab at this time.

## 2019-09-07 NOTE — PROGRESS NOTES
TRANSFER - OUT REPORT:    Verbal report given to BLANQUITA Zhang on Ivmarybeth Nipple  being transferred to PACU for routine post - op       Report consisted of patients Situation, Background, Assessment and   Recommendations(SBAR). Information from the following report(s) SBAR was reviewed with the receiving nurse. Lines:   Peripheral IV 09/07/19 Left Antecubital (Active)       Peripheral IV 09/07/19 Right Antecubital (Active)   Site Assessment Clean, dry, & intact 9/7/2019 11:48 AM   Phlebitis Assessment 0 9/7/2019 11:48 AM   Infiltration Assessment 0 9/7/2019 11:48 AM   Dressing Status Clean, dry, & intact 9/7/2019 11:48 AM   Hub Color/Line Status Pink 9/7/2019 11:48 AM        Opportunity for questions and clarification was provided.       Patient transported with:   Registered Nurse

## 2019-09-07 NOTE — H&P
Hospitalist H&P Note     Admit Date:  2019  7:08 AM   Name:  Brandy Delgadillo   Age:  68 y.o.  :  1950   MRN:  [id number]   PCP:  Noe Galvan PA-C  Treatment Team: Attending Provider: Darius Osman MD; Primary Nurse: Nico Vaughan RN    HPI:   Patient history was obtained from the ER provider prior to seeing the patient. Cc: abd pain  68 female with hospital admit for choledocholithiasis 2019 underwent ercp with stent to cbd after unable to retrieve stone. outpt follow up gi sx and lfts were normalizing and last 24hr recurrent abd pain localizing to ruq and increased lft.conistent with cbd obstruction. Wbc 18.1k with left shift but no sirs criteria. Post ercp she had afib intial rvr? And then slow vent response and discharged no rate limit meds but xarelto. outpt 2 week holter monitor completed. Apparently PAF, tele and ekg pending but rate is controlled. She continues to smoke cigarettes and has hx of  gerd . Gi plans ercp stent change out but timing unclear- last dose xarelto 19.     10 systems reviewed and negative except as noted in HPI.   Past Medical History:   Diagnosis Date    GERD (gastroesophageal reflux disease)     Smoker     1 ppd x 50 years      Past Surgical History:   Procedure Laterality Date    COLONOSCOPY N/A 7/10/2019    COL[facility]COPY performed by Sand[facility]ep [facility] MD at [facility] HX GI  2019    ERCP     [place] COLONOSCOPY FLX DX W/COLLJ SPEC WHEN PFRMD  7/10/2019         [place] EGD TRANSORAL BIOPSY SINGLE/MULTIPLE  7/10/2019           Allergies   Allergen Reactions    Codeine Other (comments)     Stomach ache    Doxycycline Other (comments)     Stomach aches    Flagyl [Metronidazole] Itching    Penicillins Rash    Potassium Nausea Only      Social History     Tobacco Use    Smoking status: Current Every Day Smoker     Packs/day: 1.00     Years: 50.00     Pack years: 50.00    Smokeless tobacco: Never Used   Substance Use Topics    Alcohol use: Not Currently      Family History   Problem Relation Age of Onset    Cancer Mother     COPD Mother     No Known Problems Father         There is no immunization history on file for this patient. PTA Medications:  Prior to Admission Medications   Prescriptions Last Dose Informant Patient Reported? Taking? Biotin 2,500 mcg cap   Yes No   Sig: Take  by mouth. Indications: patient takes med every other day   omeprazole (PRILOSEC) 20 mg capsule   No No   Si capsule bid   potassium chloride (K-DUR, KLOR-CON) 20 mEq tablet   No No   Sig: TAKE 1 TABLET BY MOUTH DAILY   raNITIdine (ZANTAC) 150 mg tablet   Yes No   Sig: Take 150 mg by mouth two (2) times a day. rivaroxaban (XARELTO) 20 mg tab tablet   No No   Sig: Take 1 Tab by mouth daily (with dinner). Facility-Administered Medications: None       Objective:     Patient Vitals for the past 24 hrs:   Temp Pulse Resp BP SpO2   19  79  120/65 98 %   19 97.8 °F (36.6 °C) (!) 109 20 182/74 95 %     Oxygen Therapy  O2 Sat (%): 98 % (19)  Pulse via Oximetry: 79 beats per minute (19)  O2 Device: Room air (19)  No intake or output data in the 24 hours ending 19 1018    Physical Exam:  Physical Exam   Constitutional: She is oriented to person, place, and time. abd pain and dry mouth   HENT:   Mouth/Throat: Oropharynx is clear and moist. No oropharyngeal exudate. Eyes: Conjunctivae are normal. No scleral icterus. Cardiovascular: Normal rate and regular rhythm. Exam reveals no gallop. Rhythm regular at time of exam   Pulmonary/Chest: Breath sounds normal. She has no wheezes. Abdominal: She exhibits no mass. There is tenderness. Localize ruq   Musculoskeletal: She exhibits no tenderness or deformity. Neurological: She is alert and oriented to person, place, and time. Skin: No rash noted. She is not diaphoretic. No erythema.    Psychiatric: Affect and judgment normal.       I reviewed the labs, imaging, EKGs, telemetry, and other studies done this admission. Data Review:   Recent Results (from the past 24 hour(s))   CBC WITH AUTOMATED DIFF    Collection Time: 09/07/19  7:10 AM   Result Value Ref Range    WBC 18.1 (H) 4.3 - 11.1 K/uL    RBC 5.47 (H) 4.05 - 5.2 M/uL    HGB 15.9 (H) 11.7 - 15.4 g/dL    HCT 48.3 (H) 35.8 - 46.3 %    MCV 88.3 79.6 - 97.8 FL    MCH 29.1 26.1 - 32.9 PG    MCHC 32.9 31.4 - 35.0 g/dL    RDW 14.1 11.9 - 14.6 %    PLATELET 214 499 - 320 K/uL    MPV 10.5 9.4 - 12.3 FL    ABSOLUTE NRBC 0.00 0.0 - 0.2 K/uL    DF AUTOMATED      NEUTROPHILS 92 (H) 43 - 78 %    LYMPHOCYTES 5 (L) 13 - 44 %    MONOCYTES 3 (L) 4.0 - 12.0 %    EOSINOPHILS 0 (L) 0.5 - 7.8 %    BASOPHILS 0 0.0 - 2.0 %    IMMATURE GRANULOCYTES 0 0.0 - 5.0 %    ABS. NEUTROPHILS 16.6 (H) 1.7 - 8.2 K/UL    ABS. LYMPHOCYTES 0.8 0.5 - 4.6 K/UL    ABS. MONOCYTES 0.5 0.1 - 1.3 K/UL    ABS. EOSINOPHILS 0.1 0.0 - 0.8 K/UL    ABS. BASOPHILS 0.1 0.0 - 0.2 K/UL    ABS. IMM. GRANS. 0.1 0.0 - 0.5 K/UL   METABOLIC PANEL, COMPREHENSIVE    Collection Time: 09/07/19  7:10 AM   Result Value Ref Range    Sodium 139 136 - 145 mmol/L    Potassium 4.0 3.5 - 5.1 mmol/L    Chloride 105 98 - 107 mmol/L    CO2 28 21 - 32 mmol/L    Anion gap 6 (L) 7 - 16 mmol/L    Glucose 170 (H) 65 - 100 mg/dL    BUN 13 8 - 23 MG/DL    Creatinine 1.07 (H) 0.6 - 1.0 MG/DL    GFR est AA >60 >60 ml/min/1.73m2    GFR est non-AA 54 (L) >60 ml/min/1.73m2    Calcium 9.3 8.3 - 10.4 MG/DL    Bilirubin, total 2.4 (H) 0.2 - 1.1 MG/DL    ALT (SGPT) 297 (H) 12 - 65 U/L    AST (SGOT) 288 (H) 15 - 37 U/L    Alk.  phosphatase 453 (H) 50 - 136 U/L    Protein, total 7.9 6.3 - 8.2 g/dL    Albumin 3.9 3.2 - 4.6 g/dL    Globulin 4.0 (H) 2.3 - 3.5 g/dL    A-G Ratio 1.0 (L) 1.2 - 3.5     LIPASE    Collection Time: 09/07/19  7:10 AM   Result Value Ref Range    Lipase 252 73 - 393 U/L   POC LACTIC ACID    Collection Time: 09/07/19  8:32 AM   Result Value Ref Range    Lactic Acid (POC) 1.18 0.5 - 1.9 mmol/L       All Micro Results     Procedure Component Value Units Date/Time    CULTURE, BLOOD [[id number]] Collected:  09/07/19 0839    Order Status:  Completed Specimen:  Blood Updated:  09/07/19 0954    CULTURE, BLOOD [[id number]] Collected:  09/07/19 0839    Order Status:  Completed Specimen:  Blood Updated:  09/07/19 0954          Other Studies:  No results found. Assessment and Plan:     Hospital Problems as of 9/7/2019 Date Reviewed: 8/20/2019          Codes Class Noted - Resolved POA    Sepsis ([facility] [address] ICD-10-CM: A41.9  ICD-9-CM: 038.9, 995.91  9/7/2019 - Present Unknown        * (Principal) Cholangitis due to bile duct calculus with obstruction ICD-10-CM: K80.31  ICD-9-CM: 576.1, 574.51  9/7/2019 - Present Unknown        Smoker ICD-10-CM: F17.200  ICD-9-CM: 305.1  9/7/2019 - Present Unknown        PAF (paroxysmal atrial fibrillation) (HCC) ICD-10-CM: I48.0  ICD-9-CM: 427.31  8/20/2019 - Present Yes        Bradycardia ICD-10-CM: R00.1  ICD-9-CM: 427.89  8/7/2019 - Present Yes        GERD (gastroesophageal reflux disease) ICD-10-CM: K21.9  ICD-9-CM: 530.81  8/5/2019 - Present Yes        Choledocholithiasis ICD-10-CM: K80.50  ICD-9-CM: 574.50  8/5/2019 - Present Yes              PLAN:  --- cbd stent obstruction, possible cholangitis   Empiric antibiotics, blood cultures unless gi feels antibiotics unnecessary. For ERCP hopeful stone retrieval, possible stent replacement  Timing unclear due to xarelto 9/6    -- paroxysmal afib   Hold xarelto, no rate limiting med due to slow vr last stay.    Tele monitoring, if afib rvr cardiology opinion    --smoker   Patch nicotine replace-counseled re: cessation    -- gerd   ppi    Discharge planning:  home  DVT ppx: scd  Code status:  full  Decision Maker:self      Admit status:inpt          Estimated LOS:  greater than 2 midnights  Risk:  high    Signed:  Melina Rodriguez DO

## 2019-09-07 NOTE — PROGRESS NOTES
TRANSFER - IN REPORT:    Verbal report received from Leatha JUARES(name) on Jose Angel Benz  being received from PACU(unit) for routine post - op      Report consisted of patients Situation, Background, Assessment and   Recommendations(SBAR). Information from the following report(s) SBAR, Kardex, Procedure Summary, Intake/Output, MAR, Recent Results, Med Rec Status and Cardiac Rhythm NS was reviewed with the receiving nurse. Opportunity for questions and clarification was provided. Assessment completed upon patients arrival to unit and care assumed.

## 2019-09-08 PROBLEM — A41.59 KLEBSIELLA SEPSIS (HCC): Status: ACTIVE | Noted: 2019-09-08

## 2019-09-08 LAB
ALBUMIN SERPL-MCNC: 2.8 G/DL (ref 3.2–4.6)
ALBUMIN/GLOB SERPL: 0.9 {RATIO} (ref 1.2–3.5)
ALP SERPL-CCNC: 299 U/L (ref 50–136)
ALT SERPL-CCNC: 153 U/L (ref 12–65)
ANION GAP SERPL CALC-SCNC: 7 MMOL/L (ref 7–16)
APTT PPP: 31.1 SEC (ref 24.7–39.8)
AST SERPL-CCNC: 84 U/L (ref 15–37)
ATRIAL RATE: 85 BPM
BASOPHILS # BLD: 0 K/UL (ref 0–0.2)
BASOPHILS NFR BLD: 0 % (ref 0–2)
BILIRUB SERPL-MCNC: 4.9 MG/DL (ref 0.2–1.1)
BUN SERPL-MCNC: 11 MG/DL (ref 8–23)
CALCIUM SERPL-MCNC: 9 MG/DL (ref 8.3–10.4)
CALCULATED P AXIS, ECG09: 60 DEGREES
CALCULATED R AXIS, ECG10: -26 DEGREES
CALCULATED T AXIS, ECG11: 94 DEGREES
CHLORIDE SERPL-SCNC: 110 MMOL/L (ref 98–107)
CO2 SERPL-SCNC: 25 MMOL/L (ref 21–32)
CREAT SERPL-MCNC: 0.85 MG/DL (ref 0.6–1)
DIAGNOSIS, 93000: NORMAL
DIFFERENTIAL METHOD BLD: ABNORMAL
EOSINOPHIL # BLD: 0 K/UL (ref 0–0.8)
EOSINOPHIL NFR BLD: 0 % (ref 0.5–7.8)
ERYTHROCYTE [DISTWIDTH] IN BLOOD BY AUTOMATED COUNT: 14.3 % (ref 11.9–14.6)
GLOBULIN SER CALC-MCNC: 3.1 G/DL (ref 2.3–3.5)
GLUCOSE SERPL-MCNC: 92 MG/DL (ref 65–100)
HCT VFR BLD AUTO: 37.7 % (ref 35.8–46.3)
HGB BLD-MCNC: 12.2 G/DL (ref 11.7–15.4)
IMM GRANULOCYTES # BLD AUTO: 0 K/UL (ref 0–0.5)
IMM GRANULOCYTES NFR BLD AUTO: 0 % (ref 0–5)
INR PPP: 1.1
LYMPHOCYTES # BLD: 1.2 K/UL (ref 0.5–4.6)
LYMPHOCYTES NFR BLD: 8 % (ref 13–44)
MCH RBC QN AUTO: 28.4 PG (ref 26.1–32.9)
MCHC RBC AUTO-ENTMCNC: 32.4 G/DL (ref 31.4–35)
MCV RBC AUTO: 87.7 FL (ref 79.6–97.8)
MONOCYTES # BLD: 1 K/UL (ref 0.1–1.3)
MONOCYTES NFR BLD: 7 % (ref 4–12)
NEUTS SEG # BLD: 12.3 K/UL (ref 1.7–8.2)
NEUTS SEG NFR BLD: 84 % (ref 43–78)
NRBC # BLD: 0 K/UL (ref 0–0.2)
P-R INTERVAL, ECG05: 178 MS
PLATELET # BLD AUTO: 258 K/UL (ref 150–450)
PMV BLD AUTO: 10.5 FL (ref 9.4–12.3)
POTASSIUM SERPL-SCNC: 3.7 MMOL/L (ref 3.5–5.1)
PROT SERPL-MCNC: 5.9 G/DL (ref 6.3–8.2)
PROTHROMBIN TIME: 14.3 SEC (ref 11.7–14.5)
Q-T INTERVAL, ECG07: 350 MS
QRS DURATION, ECG06: 86 MS
QTC CALCULATION (BEZET), ECG08: 416 MS
RBC # BLD AUTO: 4.3 M/UL (ref 4.05–5.2)
SODIUM SERPL-SCNC: 142 MMOL/L (ref 136–145)
VENTRICULAR RATE, ECG03: 85 BPM
WBC # BLD AUTO: 14.6 K/UL (ref 4.3–11.1)

## 2019-09-08 PROCEDURE — 74011250636 HC RX REV CODE- 250/636: Performed by: HOSPITALIST

## 2019-09-08 PROCEDURE — 85730 THROMBOPLASTIN TIME PARTIAL: CPT

## 2019-09-08 PROCEDURE — 65270000029 HC RM PRIVATE

## 2019-09-08 PROCEDURE — 85025 COMPLETE CBC W/AUTO DIFF WBC: CPT

## 2019-09-08 PROCEDURE — 74011250637 HC RX REV CODE- 250/637: Performed by: INTERNAL MEDICINE

## 2019-09-08 PROCEDURE — 74011250636 HC RX REV CODE- 250/636: Performed by: INTERNAL MEDICINE

## 2019-09-08 PROCEDURE — 80053 COMPREHEN METABOLIC PANEL: CPT

## 2019-09-08 PROCEDURE — 85610 PROTHROMBIN TIME: CPT

## 2019-09-08 PROCEDURE — 36415 COLL VENOUS BLD VENIPUNCTURE: CPT

## 2019-09-08 RX ORDER — PANTOPRAZOLE SODIUM 40 MG/1
40 TABLET, DELAYED RELEASE ORAL
Status: DISCONTINUED | OUTPATIENT
Start: 2019-09-08 | End: 2019-09-09 | Stop reason: HOSPADM

## 2019-09-08 RX ORDER — ACETAMINOPHEN 325 MG/1
650 TABLET ORAL
Status: DISCONTINUED | OUTPATIENT
Start: 2019-09-08 | End: 2019-09-09 | Stop reason: HOSPADM

## 2019-09-08 RX ORDER — LEVOFLOXACIN 250 MG/1
500 TABLET ORAL EVERY 24 HOURS
Status: DISCONTINUED | OUTPATIENT
Start: 2019-09-08 | End: 2019-09-08 | Stop reason: DRUGHIGH

## 2019-09-08 RX ORDER — PANTOPRAZOLE SODIUM 40 MG/1
40 TABLET, DELAYED RELEASE ORAL
Status: COMPLETED | OUTPATIENT
Start: 2019-09-08 | End: 2019-09-08

## 2019-09-08 RX ORDER — LEVOFLOXACIN 250 MG/1
750 TABLET ORAL
Status: DISCONTINUED | OUTPATIENT
Start: 2019-09-09 | End: 2019-09-09 | Stop reason: HOSPADM

## 2019-09-08 RX ORDER — METOCLOPRAMIDE HYDROCHLORIDE 5 MG/ML
5 INJECTION INTRAMUSCULAR; INTRAVENOUS EVERY 6 HOURS
Status: DISCONTINUED | OUTPATIENT
Start: 2019-09-08 | End: 2019-09-09 | Stop reason: HOSPADM

## 2019-09-08 RX ADMIN — Medication 10 ML: at 15:27

## 2019-09-08 RX ADMIN — RIVAROXABAN 15 MG: 15 TABLET, FILM COATED ORAL at 16:52

## 2019-09-08 RX ADMIN — PANTOPRAZOLE SODIUM 40 MG: 40 TABLET, DELAYED RELEASE ORAL at 16:52

## 2019-09-08 RX ADMIN — METOCLOPRAMIDE 5 MG: 5 INJECTION, SOLUTION INTRAMUSCULAR; INTRAVENOUS at 20:01

## 2019-09-08 RX ADMIN — PANTOPRAZOLE SODIUM 40 MG: 40 TABLET, DELAYED RELEASE ORAL at 12:01

## 2019-09-08 RX ADMIN — FAMOTIDINE 20 MG: 10 INJECTION, SOLUTION INTRAVENOUS at 05:47

## 2019-09-08 RX ADMIN — ACETAMINOPHEN 650 MG: 325 TABLET, FILM COATED ORAL at 20:02

## 2019-09-08 RX ADMIN — Medication 10 ML: at 05:06

## 2019-09-08 NOTE — PROGRESS NOTES
9/8/2019    Admit Date: 9/7/2019    Subjective:   CHIEF COMPLAINT- pain  HPI      Overall-better           Diet-clear    Appetite-good     Nausea-no   Vomiting-no          Pain-gerd            BM-yes   Bleeding-no    Medications-reviewed and adjusted as appropriate   IV FLUIDS-reviewed      FAM HX-per H&P   SH-per H&P   tob-yes            etoh-no      Past Medical History:   Diagnosis Date    GERD (gastroesophageal reflux disease)     Smoker     1 ppd x 50 years               Past Surgical History:   Procedure Laterality Date    COLONOSCOPY N/A 7/10/2019    COLONOSCOPY performed by Isis Simpson MD at [facility] ERCP  9/7/2019         HX GI  08/2019    ERCP     [place] COLONOSCOPY FLX DX W/COLLJ SPEC WHEN PFRMD  7/10/2019         [place] EGD TRANSORAL BIOPSY SINGLE/MULTIPLE  7/10/2019            ROS--                 RESP-neg            CARDIAC-neg                       -neg             Further ROS as per PMH and PSH- see problem list                            Objective:     Visit Vitals  /57   Pulse 67   Temp 98.9 °F (37.2 °C)   Resp 17   Ht 5' 2\" (1.575 m)   Wt 57.6 kg (127 lb)   SpO2 91%   BMI 23.23 kg/m²         Intake/Output Summary (Last 24 hours) at 9/8/2019 0955  Last data filed at 9/7/2019 1536  Gross per 24 hour   Intake 1300 ml   Output 150 ml   Net 1150 ml       EXAM:     NEURO-a&o    HEENT-wnl    LUNGS-clear       COR-rrr        ABD-soft , min tenderness, no rebound, good bs        EXT-no edema                         LABS-  Lab Results   Component Value Date/Time    WBC 14.6 (H) 09/08/2019 06:11 AM    RBC 4.30 09/08/2019 06:11 AM    HGB 12.2 09/08/2019 06:11 AM    HCT 37.7 09/08/2019 06:11 AM    PLATELET 980 38/12/8871 06:11 AM     Lab Results   Component Value Date/Time    Sodium 142 09/08/2019 06:11 AM    Potassium 3.7 09/08/2019 06:11 AM    Chloride 110 (H) 09/08/2019 06:11 AM    CO2 25 09/08/2019 06:11 AM    Anion gap 7 09/08/2019 06:11 AM    Glucose 92 09/08/2019 06:11 AM    BUN 11 09/08/2019 06:11 AM    Creatinine 0.85 09/08/2019 06:11 AM    GFR est AA >60 09/08/2019 06:11 AM    GFR est non-AA >60 09/08/2019 06:11 AM    Calcium 9.0 09/08/2019 06:11 AM    Magnesium 2.0 08/09/2019 09:33 AM    Albumin 2.8 (L) 09/08/2019 06:11 AM    Bilirubin, total 4.9 (H) 09/08/2019 06:11 AM    Protein, total 5.9 (L) 09/08/2019 06:11 AM    Globulin 3.1 09/08/2019 06:11 AM    A-G Ratio 0.9 (L) 09/08/2019 06:11 AM    AST (SGOT) 84 (H) 09/08/2019 06:11 AM    ALT (SGPT) 153 (H) 09/08/2019 06:11 AM               Assessment:     Principal Problem:    Cholangitis due to bile duct calculus with obstruction (9/7/2019)    Active Problems:    GERD (gastroesophageal reflux disease) (8/5/2019)      Choledocholithiasis (8/5/2019)      Bradycardia (8/7/2019)      PAF (paroxysmal atrial fibrillation) ([facility] [address] (8/20/2019)      Smoker (9/7/2019)      Leukocytosis (9/7/2019)    s/p clearance of pus and a large amt of stone debris and sludge  Small CHD making clearance difficulty  Fresh stent was placed    Plan:     Advance po  Possible d/c tomorrow  Repeat ERCP with hurricane dilation in 10 days as out pt       PT SEEN AND EXAMINED AND PLAN DISCUSSED AND IMPLEMENTED.   Adry Maria MD

## 2019-09-08 NOTE — PROGRESS NOTES
Pt c/o [facility] call Placed to Dr Daily Muniz and orders received. Hourly rounds completed, all needs met this shift. Protonix was started as ordered and pt has tolerated food without c/o nausea, still some c/o burning.  Will cont to monitor until night shift nurse takes over

## 2019-09-08 NOTE — PROGRESS NOTES
Pt stated she is allergic to flagyl. MD made aware and telephone order received to d/c. Blood cultures grew gram negative rods, klebsiella pneumoniae. MD made aware, state was covered by abx already ordered. Pt c/o some heart burn this am. 0900 pepcid pulled and given early. Hourly rounds performed through shift, pt denies needs at this time. Bed in low position and call light/ personal items within reach. Will continue to monitor and report to day shift nurse.

## 2019-09-08 NOTE — PROGRESS NOTES
Hospitalist Progress Note     Admit Date:  2019  7:08 AM   Name:  Jose Angel Benz   Age:  68 y.o.  :  1950   MRN:  [id number]   PCP:  Tye Daugherty PA-C  Treatment Team: Attending Provider: Anaya Overton DO; Consulting Provider: Caroline Pinto MD; Primary Nurse: Eric Bryan RN; Utilization Review: Krystin Carmen RN    Subjective:   HPI and or CC:  Cc: abd pain, malaise    From hpi admit :  68 female with hospital admit for choledocholithiasis 2019 underwent ercp with stent to cbd after unable to retrieve stone. outpt follow up gi sx and lfts were normalizing and last 24hr recurrent abd pain localizing to ruq and increased lft.conistent with cbd obstruction. Wbc 18.1k with left shift but no sirs criteria. Post ercp she had afib intial rvr? And then slow vent response and discharged no rate limit meds but xarelto. outpt 2 week holter monitor completed. Apparently PAF, tele and ekg pending but rate is controlled. She continues to smoke cigarettes and has hx of  gerd . Gi plans ercp stent change out but timing unclear- last dose xarelto 19.     19:  Doing better. Klebsiella bacteremia/sepsis suspected gb source -urine ordered but not collected sent. Not carbapenem resist and on quinolone clinically improved. Gi was able to break up/retrieve cbd stone but residual disease and stent. Plan per gi with repeat ERCP hurricaine dilation possible in 10 day as outpt. hx of afib but sr apcs on ekg .        Objective:     Patient Vitals for the past 24 hrs:   Temp Pulse Resp BP SpO2   19 1152 98.4 °F (36.9 °C) (!) 59 18 108/73 95 %   19 0752 98.9 °F (37.2 °C) 67 17 103/57 91 %   19 0555 98.2 °F (36.8 °C) 63 18 93/53 92 %   19 0400  (!) 50      19 0000  68      19 2311 98.4 °F (36.9 °C) 67 17 96/55 93 %   19 2102 98 °F (36.7 °C) 75 18 94/59 93 %   19 1639 97.6 °F (36.4 °C) 81 17 100/64 95 %   19 1536 98.4 °F (36.9 °C) 72 15 107/57 97 %   09/07/19 1526  80 19 121/63 97 %   09/07/19 1516  81 15 116/62 97 %   09/07/19 1510  82 18 119/69 97 %   09/07/19 1506  85 17 131/68 97 %   09/07/19 1500  82 16 98/55 94 %   09/07/19 1458 98.9 °F (37.2 °C) 83 18 119/62 94 %   09/07/19 1456  83 16 119/62 94 %   09/07/19 1455  87 24  94 %     Oxygen Therapy  O2 Sat (%): 95 % (09/08/19 1152)  Pulse via Oximetry: 73 beats per minute (09/07/19 1536)  O2 Device: Nasal cannula (09/07/19 1536)  O2 Flow Rate (L/min): 2 l/min (09/07/19 1536)    Intake/Output Summary (Last 24 hours) at 9/8/2019 1447  Last data filed at 9/7/2019 1536  Gross per 24 hour   Intake 300 ml   Output 150 ml   Net 150 ml         REVIEW OF SYSTEMS: Comprehensive ROS performed and negative except as stated in HPI. Physical Examination:  General:    Well nourished. No gross distress. Head:  Normocephalic, atraumatic, nares patent  Eyes:  Extraocular movements intact, normal sclera  CV:   RRR. No  Murmurs, clicks, or gallops, distal pulses intact  Lungs:   Unlabored, no cyanosis, no wheeze  Abdomen:   Soft, nondistended, tenderness improving   Extremities: Warm and dry. No cyanosis or edema. Skin:     No rashes or jaundice. Neuro:  No gross focal deficits, no tremor  Psych:  Mood and affect appropriate    Data Review:  I have reviewed all labs, meds, telemetry events, and studies from the last 24 hours.     Recent Results (from the past 24 hour(s))   CBC WITH AUTOMATED DIFF    Collection Time: 09/08/19  6:11 AM   Result Value Ref Range    WBC 14.6 (H) 4.3 - 11.1 K/uL    RBC 4.30 4.05 - 5.2 M/uL    HGB 12.2 11.7 - 15.4 g/dL    HCT 37.7 35.8 - 46.3 %    MCV 87.7 79.6 - 97.8 FL    MCH 28.4 26.1 - 32.9 PG    MCHC 32.4 31.4 - 35.0 g/dL    RDW 14.3 11.9 - 14.6 %    PLATELET 015 888 - 727 K/uL    MPV 10.5 9.4 - 12.3 FL    ABSOLUTE NRBC 0.00 0.0 - 0.2 K/uL    DF AUTOMATED      NEUTROPHILS 84 (H) 43 - 78 %    LYMPHOCYTES 8 (L) 13 - 44 %    MONOCYTES 7 4.0 - 12.0 % EOSINOPHILS 0 (L) 0.5 - 7.8 %    BASOPHILS 0 0.0 - 2.0 %    IMMATURE GRANULOCYTES 0 0.0 - 5.0 %    ABS. NEUTROPHILS 12.3 (H) 1.7 - 8.2 K/UL    ABS. LYMPHOCYTES 1.2 0.5 - 4.6 K/UL    ABS. MONOCYTES 1.0 0.1 - 1.3 K/UL    ABS. EOSINOPHILS 0.0 0.0 - 0.8 K/UL    ABS. BASOPHILS 0.0 0.0 - 0.2 K/UL    ABS. IMM. GRANS. 0.0 0.0 - 0.5 K/UL   METABOLIC PANEL, COMPREHENSIVE    Collection Time: 09/08/19  6:11 AM   Result Value Ref Range    Sodium 142 136 - 145 mmol/L    Potassium 3.7 3.5 - 5.1 mmol/L    Chloride 110 (H) 98 - 107 mmol/L    CO2 25 21 - 32 mmol/L    Anion gap 7 7 - 16 mmol/L    Glucose 92 65 - 100 mg/dL    BUN 11 8 - 23 MG/DL    Creatinine 0.85 0.6 - 1.0 MG/DL    GFR est AA >60 >60 ml/min/1.73m2    GFR est non-AA >60 >60 ml/min/1.73m2    Calcium 9.0 8.3 - 10.4 MG/DL    Bilirubin, total 4.9 (H) 0.2 - 1.1 MG/DL    ALT (SGPT) 153 (H) 12 - 65 U/L    AST (SGOT) 84 (H) 15 - 37 U/L    Alk. phosphatase 299 (H) 50 - 136 U/L    Protein, total 5.9 (L) 6.3 - 8.2 g/dL    Albumin 2.8 (L) 3.2 - 4.6 g/dL    Globulin 3.1 2.3 - 3.5 g/dL    A-G Ratio 0.9 (L) 1.2 - 3.5     PROTHROMBIN TIME + INR    Collection Time: 09/08/19  6:11 AM   Result Value Ref Range    Prothrombin time 14.3 11.7 - 14.5 sec    INR 1.1     PTT    Collection Time: 09/08/19  6:11 AM   Result Value Ref Range    aPTT 31.1 24.7 - 39.8 SEC        All Micro Results     Procedure Component Value Units Date/Time    BLOOD CULTURE ID PANEL [[id number]]  (Abnormal) Collected:  09/07/19 0839    Order Status:  Completed Specimen:  Blood Updated:  09/08/19 0848     Acc. no. from Micro Order [id number]     Klebsiella pneumoniae DETECTED        Comment: RESULTS VERIFIED, PHONED TO AND READ BACK BY  BLANQUITA MCDONOUGH @ 1886 ON [id number] BY GEOFF          KPC (Carbapenem Resistance Gene) NOT DETECTED        Comment: WARNING:  A Not Detected result for the KPC gene does not indicate susceptibility to carbapenems.   Gram negative bacteria can be resistant to carbapenems by mechanisms other than carrying the KPC gene. INTERPRETATION       Gram negative amy. Identified as Klebsiella pneumoniae. Clinical Consideration PENDING    CULTURE, BLOOD [[id number]]  (Abnormal) Collected:  09/07/19 0839    Order Status:  Completed Specimen:  Blood Updated:  09/08/19 0700     Special Requests: --        RIGHT  Antecubital       GRAM STAIN GRAM NEGATIVE RODS               AEROBIC AND ANAEROBIC BOTTLES                  CRITICAL RESULT NOT CALLED DUE TO PREVIOUS NOTIFICATION OF CRITICAL RESULT WITHIN THE LAST 24 HOURS. RESULTS VERIFIED, PHONED TO AND READ BACK BY Tez Vilal @ [phone] WM [id number] BY [facility]           Culture result: GRAM NEGATIVE RODS               CULTURE IN [address] UPDATES TO FOLLOW          CULTURE, BLOOD [[id number]]  (Abnormal) Collected:  09/07/19 0839    Order Status:  Completed Specimen:  Blood Updated:  09/08/19 0658     Special Requests: --        RIGHT  HAND       GRAM STAIN GRAM NEGATIVE RODS               AEROBIC AND ANAEROBIC BOTTLES                  RESULTS VERIFIED, PHONED TO AND READ BACK BY Summer Sullivan RN BY Chris Rios AT [phone]           Culture result:       [address] [facility] IDENTIFICATION AND SUSCEPTIBILITY TO FOLLOW            REFER TO [facility] aYp PANEL [address] [facility] [id number]    BLOOD CULTURE ID PANEL [[id number]]  (Abnormal) Collected:  09/07/19 0838    Order Status:  Completed Specimen:  Blood Updated:  09/07/19 2237     Acc. no. from Micro Order [id number]     Klebsiella pneumoniae DETECTED        Comment: Summer Sullivan RN BY [facility] AT [phone]        KPC (Carbapenem Resistance Gene) NOT DETECTED        Comment: WARNING:  A Not Detected result for the KPC gene does not indicate susceptibility to carbapenems. Gram negative bacteria can be resistant to carbapenems by mechanisms other than carrying the KPC gene.         Clinical Consideration PENDING          Current Meds:  Current Facility-Administered Medications   Medication Dose Route Frequency    pantoprazole (PROTONIX) tablet 40 mg  40 mg Oral ACB&D    [START ON 9/9/2019] levoFLOXacin (LEVAQUIN) tablet 750 mg  750 mg Oral Q48H    rivaroxaban (XARELTO) tablet 15 mg  15 mg Oral DAILY WITH DINNER    sodium chloride (NS) flush 5-40 mL  5-40 mL IntraVENous Q8H    sodium chloride (NS) flush 5-40 mL  5-40 mL IntraVENous PRN    naloxone (NARCAN) injection 0.4 mg  0.4 mg IntraVENous PRN    ondansetron (ZOFRAN) injection 4 mg  4 mg IntraVENous Q4H PRN    bisacodyl (DULCOLAX) suppository 10 mg  10 mg Rectal DAILY PRN    nicotine (NICODERM CQ) 21 mg/24 hr patch 1 Patch  1 Patch TransDERmal Q24H    HYDROmorphone (PF) (DILAUDID) injection 1 mg  1 mg IntraVENous Q4H PRN       Diet:  DIET REGULAR    Other Studies (last 24 hours):  Xr Ercp / Ercb Combined    Result Date: 9/7/2019  ERCP 9/7/2019 CLINICAL HISTORY: Cholecystitis, and stones. FINDINGS: 4 intraoperative fluoroscopic spot images are submitted for evaluation. The total fluoroscopy time is indicated to be 14.58 minutes. 30 mL one half dilution Conray 60 was administered via the endoscope. The initial image shows a dilated extra hepatic bile duct. Multiple filling defects are suggested within the extra hepatic bile ducts consistent with stones as described on clinical history provided. Final images show placement of a stent. Persistent filling defects are seen which could represent residual stones or additional filling material such as air bubbles. Recommend correlation with procedure notes and impressions. IMPRESSION: 1. Imaging findings as described above. Please refer to procedure notes and impressions for additional information.        Assessment and Plan:     Hospital Problems as of 9/8/2019 Date Reviewed: 9/7/2019          Codes Class Noted - Resolved POA    Klebsiella sepsis [facility]) ICD-10-CM: A41.4  ICD-9-CM: 038.49, 995.91  9/8/2019 - Present Unknown        Sepsis ([facility] ICD-10-CM: A41.9  ICD-9-CM: 038.9, 995.91  9/7/2019 - Present         * (Principal) Cholangitis due to bile duct calculus with obstruction ICD-10-CM: K80.31  ICD-9-CM: 576.1, 574.51  9/7/2019 - Present Unknown        Smoker ICD-10-CM: F17.200  ICD-9-CM: 305.1  9/7/2019 - Present Unknown        Leukocytosis ICD-10-CM: V83.016  ICD-9-CM: 288.60  9/7/2019 - Present Unknown        PAF (paroxysmal atrial fibrillation) (HCC) ICD-10-CM: I48.0  ICD-9-CM: 427.31  8/20/2019 - Present Yes        Bradycardia ICD-10-CM: R00.1  ICD-9-CM: 427.89  8/7/2019 - Present Yes        GERD (gastroesophageal reflux disease) ICD-10-CM: K21.9  ICD-9-CM: 530.81  8/5/2019 - Present Yes        Choledocholithiasis ICD-10-CM: K80.50  ICD-9-CM: 574.50  8/5/2019 - Present Yes              A/P:    --- cbd stent obstruction  S/p ercp with retrieval and replace biliary stent  See report              klebsiella bacteremia gb source   Continue quinolone   - hurricaine dilation planned in 10 day   Possible dc am     -- paroxysmal afib              gi resumed  xarelto, no rate limiting med due to slow vr last stay. tele until dc        --smoker              Patch nicotine replace-counseled re: cessation     -- gerd              ppi    DC planning/Dispo:    DVT ppx:      Code status:  Medical decision maker:      Signed:  Gray HOUSTON

## 2019-09-09 VITALS
SYSTOLIC BLOOD PRESSURE: 118 MMHG | HEIGHT: 62 IN | HEART RATE: 58 BPM | RESPIRATION RATE: 20 BRPM | OXYGEN SATURATION: 96 % | DIASTOLIC BLOOD PRESSURE: 81 MMHG | WEIGHT: 127 LBS | BODY MASS INDEX: 23.37 KG/M2 | TEMPERATURE: 98.1 F

## 2019-09-09 LAB
ACC. NO. FROM MICRO ORDER, ACCP: ABNORMAL
ALBUMIN SERPL-MCNC: 2.6 G/DL (ref 3.2–4.6)
ALBUMIN/GLOB SERPL: 0.8 {RATIO} (ref 1.2–3.5)
ALP SERPL-CCNC: 289 U/L (ref 50–136)
ALT SERPL-CCNC: 112 U/L (ref 12–65)
ANION GAP SERPL CALC-SCNC: 8 MMOL/L (ref 7–16)
AST SERPL-CCNC: 45 U/L (ref 15–37)
BACTERIA SPEC CULT: ABNORMAL
BASOPHILS # BLD: 0 K/UL (ref 0–0.2)
BASOPHILS NFR BLD: 0 % (ref 0–2)
BILIRUB SERPL-MCNC: 2.4 MG/DL (ref 0.2–1.1)
BUN SERPL-MCNC: 9 MG/DL (ref 8–23)
CALCIUM SERPL-MCNC: 8.2 MG/DL (ref 8.3–10.4)
CHLORIDE SERPL-SCNC: 109 MMOL/L (ref 98–107)
CO2 SERPL-SCNC: 25 MMOL/L (ref 21–32)
CREAT SERPL-MCNC: 0.85 MG/DL (ref 0.6–1)
DIFFERENTIAL METHOD BLD: NORMAL
EOSINOPHIL # BLD: 0.4 K/UL (ref 0–0.8)
EOSINOPHIL NFR BLD: 5 % (ref 0.5–7.8)
ERYTHROCYTE [DISTWIDTH] IN BLOOD BY AUTOMATED COUNT: 14.4 % (ref 11.9–14.6)
GLOBULIN SER CALC-MCNC: 3.3 G/DL (ref 2.3–3.5)
GLUCOSE SERPL-MCNC: 108 MG/DL (ref 65–100)
GRAM STN SPEC: ABNORMAL
HCT VFR BLD AUTO: 37.7 % (ref 35.8–46.3)
HGB BLD-MCNC: 12.3 G/DL (ref 11.7–15.4)
IMM GRANULOCYTES # BLD AUTO: 0 K/UL (ref 0–0.5)
IMM GRANULOCYTES NFR BLD AUTO: 0 % (ref 0–5)
INTERPRETATION: ABNORMAL
KLEBSIELLA PNEUMONIAE: DETECTED
KPC (CARBAPENEM RESISTANCE GENE): NOT DETECTED
LYMPHOCYTES # BLD: 1.1 K/UL (ref 0.5–4.6)
LYMPHOCYTES NFR BLD: 15 % (ref 13–44)
MCH RBC QN AUTO: 28.7 PG (ref 26.1–32.9)
MCHC RBC AUTO-ENTMCNC: 32.6 G/DL (ref 31.4–35)
MCV RBC AUTO: 87.9 FL (ref 79.6–97.8)
MONOCYTES # BLD: 0.7 K/UL (ref 0.1–1.3)
MONOCYTES NFR BLD: 9 % (ref 4–12)
NEUTS SEG # BLD: 5.5 K/UL (ref 1.7–8.2)
NEUTS SEG NFR BLD: 71 % (ref 43–78)
NRBC # BLD: 0 K/UL (ref 0–0.2)
PLATELET # BLD AUTO: 251 K/UL (ref 150–450)
PMV BLD AUTO: 10.2 FL (ref 9.4–12.3)
POTASSIUM SERPL-SCNC: 3.2 MMOL/L (ref 3.5–5.1)
PROT SERPL-MCNC: 5.9 G/DL (ref 6.3–8.2)
RBC # BLD AUTO: 4.29 M/UL (ref 4.05–5.2)
SERVICE CMNT-IMP: ABNORMAL
SERVICE CMNT-IMP: ABNORMAL
SODIUM SERPL-SCNC: 142 MMOL/L (ref 136–145)
WBC # BLD AUTO: 7.7 K/UL (ref 4.3–11.1)

## 2019-09-09 PROCEDURE — 74011250637 HC RX REV CODE- 250/637: Performed by: INTERNAL MEDICINE

## 2019-09-09 PROCEDURE — 80053 COMPREHEN METABOLIC PANEL: CPT

## 2019-09-09 PROCEDURE — 74011250636 HC RX REV CODE- 250/636: Performed by: HOSPITALIST

## 2019-09-09 PROCEDURE — 36415 COLL VENOUS BLD VENIPUNCTURE: CPT

## 2019-09-09 PROCEDURE — 85025 COMPLETE CBC W/AUTO DIFF WBC: CPT

## 2019-09-09 RX ORDER — LEVOFLOXACIN 750 MG/1
750 TABLET ORAL
Qty: 4 TAB | Refills: 0 | Status: SHIPPED | OUTPATIENT
Start: 2019-09-11 | End: 2019-09-17

## 2019-09-09 RX ORDER — LEVOFLOXACIN 750 MG/1
750 TABLET ORAL
Qty: 4 TAB | Refills: 0 | Status: SHIPPED | OUTPATIENT
Start: 2019-09-11 | End: 2019-09-09

## 2019-09-09 RX ORDER — POTASSIUM CHLORIDE 20 MEQ/1
20 TABLET, EXTENDED RELEASE ORAL
Status: COMPLETED | OUTPATIENT
Start: 2019-09-09 | End: 2019-09-09

## 2019-09-09 RX ORDER — POTASSIUM CHLORIDE 20 MEQ/1
40 TABLET, EXTENDED RELEASE ORAL EVERY 4 HOURS
Status: DISCONTINUED | OUTPATIENT
Start: 2019-09-09 | End: 2019-09-09

## 2019-09-09 RX ADMIN — POTASSIUM CHLORIDE 20 MEQ: 1500 TABLET, EXTENDED RELEASE ORAL at 09:30

## 2019-09-09 RX ADMIN — METOCLOPRAMIDE 5 MG: 5 INJECTION, SOLUTION INTRAMUSCULAR; INTRAVENOUS at 05:30

## 2019-09-09 RX ADMIN — Medication 10 ML: at 05:31

## 2019-09-09 RX ADMIN — PANTOPRAZOLE SODIUM 40 MG: 40 TABLET, DELAYED RELEASE ORAL at 05:30

## 2019-09-09 RX ADMIN — POTASSIUM CHLORIDE 20 MEQ: 1500 TABLET, EXTENDED RELEASE ORAL at 12:00

## 2019-09-09 RX ADMIN — METOCLOPRAMIDE 5 MG: 5 INJECTION, SOLUTION INTRAMUSCULAR; INTRAVENOUS at 12:00

## 2019-09-09 RX ADMIN — LEVOFLOXACIN 750 MG: 250 TABLET, FILM COATED ORAL at 09:30

## 2019-09-09 RX ADMIN — METOCLOPRAMIDE 5 MG: 5 INJECTION, SOLUTION INTRAMUSCULAR; INTRAVENOUS at 00:24

## 2019-09-09 RX ADMIN — POTASSIUM CHLORIDE 20 MEQ: 1500 TABLET, EXTENDED RELEASE ORAL at 14:46

## 2019-09-09 NOTE — PROGRESS NOTES
Discharge instructions and prescriptions given and reviewed with pt, verbalizes understanding, pt to be discharged home after 2 pm potassium given, pt to call desk when ready to leave.

## 2019-09-09 NOTE — DISCHARGE INSTRUCTIONS
DISCHARGE SUMMARY from Nurse    PATIENT INSTRUCTIONS:    After general anesthesia or intravenous sedation, for 24 hours or while taking prescription Narcotics:  · Limit your activities  · Do not drive and operate hazardous machinery  · Do not make important personal or business decisions  · Do  not drink alcoholic beverages  · If you have not urinated within 8 hours after discharge, please contact your surgeon on call. Report the following to your surgeon:  · Excessive pain, swelling, redness or odor of or around the surgical area  · Temperature over 100.5  · Nausea and vomiting lasting longer than 4 hours or if unable to take medications  · Any signs of decreased circulation or nerve impairment to extremity: change in color, persistent  numbness, tingling, coldness or increase pain  · Any questions    What to do at Home:  Recommended activity: Activity as tolerated    If you experience any of the following symptoms Increased abdominal pain, nausea/vomiting, or fever greater than 101, please follow up with [facility]    *  Please give a list of your current medications to your Primary Care Provider. *  Please update this list whenever your medications are discontinued, doses are      changed, or new medications (including over-the-counter products) are added. *  Please carry medication information at all times in case of emergency situations. These are general instructions for a healthy lifestyle:    No smoking/ No tobacco products/ Avoid exposure to second hand smoke  Surgeon General's Warning:  Quitting smoking now greatly reduces serious risk to your health.     Obesity, smoking, and sedentary lifestyle greatly increases your risk for illness    A healthy diet, regular physical exercise & weight monitoring are important for maintaining a healthy lifestyle    You may be retaining fluid if you have a history of heart failure or if you experience any of the following symptoms:  Weight gain of 3 pounds or more overnight or 5 pounds in a week, increased swelling in our hands or feet or shortness of breath while lying flat in bed. Please call your doctor as soon as you notice any of these symptoms; do not wait until your next office visit. The discharge information has been reviewed with the patient. The patient verbalized understanding. Discharge medications reviewed with the patient and appropriate educational materials and side effects teaching were provided.   ___________________________________________________________________________________________________________________________________

## 2019-09-09 NOTE — PROGRESS NOTES
Pt to DC home today with no needs identified. Care Management Interventions  PCP Verified by CM: Yes  Mode of Transport at Discharge:  Other (see comment)  Transition of Care Consult (CM Consult): Discharge Planning  Discharge Durable Medical Equipment: No  Physical Therapy Consult: No  Occupational Therapy Consult: No  Current Support Network: Own Home  Confirm Follow Up Transport: Family  Plan discussed with Pt/Family/Caregiver: Yes  Freedom of Choice Offered: Yes  Discharge Location  Discharge Placement: Home

## 2019-09-09 NOTE — PROGRESS NOTES
[facility] Progress Note         Admit Date:  9/7/2019    Today's Date:  9/9/2019    CC:  Elevated LFT, Abdominal pain, Choledocholithiasis    Subjective:     Patient with improved pain. She has not had any nausea or vomiting. She is receiving Levaquin. Tolerating regular diet. Improvement of LFTs today. Anxious to go home. Has been switched to PO antibiotics. Medications:   Current Facility-Administered Medications   Medication Dose Route Frequency    potassium chloride (K-DUR, KLOR-CON) SR tablet 20 mEq  20 mEq Oral Q2H    pantoprazole (PROTONIX) tablet 40 mg  40 mg Oral ACB&D    levoFLOXacin (LEVAQUIN) tablet 750 mg  750 mg Oral Q48H    rivaroxaban (XARELTO) tablet 15 mg  15 mg Oral DAILY WITH DINNER    acetaminophen (TYLENOL) tablet 650 mg  650 mg Oral Q6H PRN    metoclopramide HCl (REGLAN) injection 5 mg  5 mg IntraVENous Q6H    sodium chloride (NS) flush 5-40 mL  5-40 mL IntraVENous Q8H    sodium chloride (NS) flush 5-40 mL  5-40 mL IntraVENous PRN    naloxone (NARCAN) injection 0.4 mg  0.4 mg IntraVENous PRN    ondansetron (ZOFRAN) injection 4 mg  4 mg IntraVENous Q4H PRN    bisacodyl (DULCOLAX) suppository 10 mg  10 mg Rectal DAILY PRN    nicotine (NICODERM CQ) 21 mg/24 hr patch 1 Patch  1 Patch TransDERmal Q24H    HYDROmorphone (PF) (DILAUDID) injection 1 mg  1 mg IntraVENous Q4H PRN       Review of Systems:  ROS was obtained, with pertinent positives as listed above. No chest pain or SOB. Diet:      Objective:   Vitals:  Visit Vitals  /70 (BP 1 Location: Right arm, BP Patient Position: At rest)   Pulse (!) 58   Temp 98.1 °F (36.7 °C)   Resp 18   Ht 5' 2\" (1.575 m)   Wt 57.6 kg (127 lb)   SpO2 96%   BMI 23.23 kg/m²     Intake/Output:  09/09 0701 - 09/09 1900  In: 240 [P.O.:240]  Out: -   No intake/output data recorded.   Exam:  General appearance: alert, cooperative, no distress  Lungs: clear to auscultation bilaterally anteriorly  Heart: regular rate and rhythm  Abdomen: soft, mild epigastric TTP. Bowel sounds normal. No masses, no organomegaly  Extremities: extremities normal, atraumatic, no cyanosis or edema  Neuro:  alert and oriented    Data Review (Labs):    Recent Labs     09/09/19  0636 09/08/19  0611 09/07/19  0710   WBC 7.7 14.6* 18.1*   HGB 12.3 12.2 15.9*   HCT 37.7 37.7 48.3*    258 372   MCV 87.9 87.7 88.3    142 139   K 3.2* 3.7 4.0   * 110* 105   CO2 25 25 28   BUN 9 11 13   CREA 0.85 0.85 1.07*   CA 8.2* 9.0 9.3   * 92 170*   * 299* 453*   SGOT 45* 84* 288*   * 153* 297*   TBILI 2.4* 4.9* 2.4*   ALB 2.6* 2.8* 3.9   TP 5.9* 5.9* 7.9   LPSE  --   --  252   PTP  --  14.3  --    INR  --  1.1  --    APTT  --  31.1  --      TTE 8/8/2019:   TTE (8/8/19): normal EF, mild LAE  -------------------------------------------     RIGHT UPPER QUADRANT  ULTRASOUND 8/5/2019     INDICATION: Abdominal pain gallstones     COMPARISON: 05/07/2019     Transabdominal imaging of the upper abdomen was performed.     FINDINGS:   -LIVER: 17 cm.  Normal echogenicity.  No masses. -BILE DUCTS: Moderate bile duct dilatation.  CBD diameter = 17 mm.  At least one  stone in the common duct. -GALLBLADDER: Cholelithiasis.  No significant gallbladder wall thickening. -PANCREAS: Normal.     -RIGHT KIDNEY: 10.1 cm.  1 cm upper pole cyst.  No mass or hydronephrosis. -ABDOMINAL AORTA AND IVC: Normal in size. -ASCITES: No free fluid.     IMPRESSION  IMPRESSION: Cholelithiasis and choledocholithiasis.  Stable bile duct  dilatation. ENDOSCOPIC  RETROGRADE CHOLANGIOPANCREATOGRAPHY   DATE of PROCEDURE: 9/7/2019   PT NAME: Domenic [id number]   MEDICATION: general;    INSTRUMENT:  [id number] VF   SPECIAL PROCEDURE: stent removal; balloon sweep; basket with mechanical lithotripsy; stone extraction; 8.5 fr / 10 cm biliary stent  BLOOD LOSS- 0 to min.   SPEC- no  IMPLANT- none   PROCEDURE: standard procedure w/o complications   ASSESSMENT:  1. Large CHD stone with large amt of impacted stones and sludge- able to fracture and remove a large amt of stones and sludge but distal CHD was not dilated and could not clear the duct so a stent was replaced  2. Pancreas not evaluated    3. GB not seen but cystic duct was normal- low takeoff   PLAN:  1. inpt follow up  2. Don't think laser will help  3. Consider Hurricane dilation in 3 weeks to facilitate Sherry MD Aly    Assessment:     Principal Problem:    Cholangitis due to bile duct calculus with obstruction (9/7/2019)    Active Problems:    GERD (gastroesophageal reflux disease) (8/5/2019)      Choledocholithiasis (8/5/2019)      Bradycardia (8/7/2019)      PAF (paroxysmal atrial fibrillation) ([facility] (8/20/2019)      Smoker (9/7/2019)      Leukocytosis (9/7/2019)      Klebsiella sepsis ([facility] (9/8/2019)     Patient is a 68 y.o. female with PMH of Afib on Xarelto, Bradycardia, Choledocholithiasis s/p bilary stent placement 8/6/19 with Dr. Katherin Aase , who is seen in consultation at the request of Dr. Socorro Craig for elevated LFT, abdominal pain, choledocholithiasis. ERCP with Dr Jennifer Szymanski 9/7/19 with large CHD stone and large amount of impacted stones and sludge. Stent replaced. Plans for repeat ERCP with Dr Jennifer Szymanski in 10 days for further treatment. Plan:     -Needs repeat ERCP. Per Dr Kerwin Schmitt recommendations, will need to be scheduled in 10 days. Pt reports that she is already on the schedule with Dr Katherin Aase on 9/18/19. Will contact office to clarify arrangements.   -On [address] has been resumed  -GI to sign off. GEORGE Gonzalez    Patient is seen and examined in collaboration with Dr. Milena Ocampo. Assessment and plan as per Dr. Milena Ocampo.

## 2019-09-09 NOTE — DISCHARGE SUMMARY
Hospitalist Discharge Summary     Admit Date:  2019  7:08 AM   Name:  Alvin Ely   Age:  68 y.o.  :  1950   MRN:  [id number]   PCP:  Candida Prescott PA-C  Treatment Team: Attending Provider: Sudeep Mendoza DO; Consulting Provider: Divine Syed MD; Utilization Review: Betina Escobar RN; Primary Nurse: Bernie Hinojosa Primary Nurse: Almita Hayden RN; Care Manager: Jeremiah Carrillo LMSW    Problem List for this Hospitalization:  Hospital Problems as of 2019 Date Reviewed: 2019          Codes Class Noted - Resolved POA    Klebsiella sepsis [facility]) ICD-10-CM: A41.4  ICD-9-CM: 038.49, 995.91  2019 - Present Unknown        Sepsis ([facility] ICD-10-CM: A41.9  ICD-9-CM: 038.9, 995.91  2019 - Present         * (Principal) Cholangitis due to bile duct calculus with obstruction ICD-10-CM: K80.31  ICD-9-CM: 576.1, 574.51  2019 - Present Unknown        Smoker ICD-10-CM: F17.200  ICD-9-CM: 305.1  2019 - Present Unknown        Leukocytosis ICD-10-CM: D72.829  ICD-9-CM: 288.60  2019 - Present Unknown        PAF (paroxysmal atrial fibrillation) ([facility] ICD-10-CM: I48.0  ICD-9-CM: 427.31  2019 - Present Yes        Bradycardia ICD-10-CM: R00.1  ICD-9-CM: 427.89  2019 - Present Yes        GERD (gastroesophageal reflux disease) ICD-10-CM: K21.9  ICD-9-CM: 530.81  2019 - Present Yes        Choledocholithiasis ICD-10-CM: K80.50  ICD-9-CM: 574.50  2019 - Present Yes                Admission HPI from 2019:    \" 68 female with hospital admit for choledocholithiasis 2019 underwent ercp with stent to cbd after unable to retrieve stone. outpt follow up gi sx and lfts were normalizing and last 24hr recurrent abd pain localizing to ruq and increased lft.conistent with cbd obstruction. Wbc 18.1k with left shift but no sirs criteria. Post ercp she had afib intial rvr? And then slow vent response and discharged no rate limit meds but xarelto.  outpt 2 week holter monitor completed. Apparently PAF, tele and ekg pending but rate is controlled. She continues to smoke cigarettes and has hx of  gerd . Gi plans ercp stent change out but timing unclear- last dose xarelto 9/6/19. \"    Hospital Course:  Admit with obstructed cbd stent and stone with clinical cholangitis with leukocytosis left shift. Ercp performed and gi was able to break up cbd stone but residual disease remains and needs follow ercp as outpt- a stent was replaced-see report. she had klebsiella bacteremia and clinically improved with quinolone. She has PAFib and takes xarelto and this was resumed post ercp. Chronic gerd and hypokalemia for which she takes ppi and kcl. She may be dischaged home with 4 more doses of levaquin, and outpt gi follow up as needs \"hurricaine dilation \" appt has been scheduled. She is to follow up primary care re: k and infection. And follow up cardiology re: paf. She is not on rate control as she had peyman response to rate limiting meds and rate controlled without. She may be discharged home for outpt follow up    [address] [place] [address]      Follow up instructions and discharge meds at bottom of this note. Plan was discussed with patient. All questions answered. Patient was stable at time of discharge. 10 systems reviewed and negative except as noted in HPI. Diagnostic Imaging/Tests:   Xr Ercp / Ercb Combined    Result Date: 9/7/2019  ERCP 9/7/2019 CLINICAL HISTORY: Cholecystitis, and stones. FINDINGS: 4 intraoperative fluoroscopic spot images are submitted for evaluation. The total fluoroscopy time is indicated to be 14.58 minutes. 30 mL one half dilution Conray 60 was administered via the endoscope. The initial image shows a dilated extra hepatic bile duct. Multiple filling defects are suggested within the extra hepatic bile ducts consistent with stones as described on clinical history provided.  Final images show placement of a stent. Persistent filling defects are seen which could represent residual stones or additional filling material such as air bubbles. Recommend correlation with procedure notes and impressions. IMPRESSION: 1. Imaging findings as described above. Please refer to procedure notes and impressions for additional information. Echocardiogram results:  No results found for this visit on 09/07/19. All Micro Results     Procedure Component Value Units Date/Time    CULTURE, BLOOD [[id number]]  (Abnormal) Collected:  09/07/19 0839    Order Status:  Completed Specimen:  Blood Updated:  09/09/19 0710     Special Requests: --        RIGHT  Antecubital       GRAM STAIN GRAM NEGATIVE RODS               AEROBIC AND ANAEROBIC BOTTLES                  CRITICAL RESULT NOT CALLED DUE TO PREVIOUS NOTIFICATION OF CRITICAL RESULT WITHIN THE LAST 24 HOURS.                   RESULTS VERIFIED, PHONED TO AND READ BACK BY BLANQUITA MCDONOUGH @ [phone] ON [id number] BY hospitals           Culture result: GRAM NEGATIVE RODS         REFER TO [address] [id number] FOR ID AND SUSCEPTIBILITY    CULTURE, BLOOD [[id number]]  (Abnormal)  (Susceptibility) Collected:  09/07/19 0839    Order Status:  Completed Specimen:  Blood Updated:  09/09/19 0709     Special Requests: --        RIGHT  HAND       GRAM STAIN GRAM NEGATIVE RODS               AEROBIC AND ANAEROBIC BOTTLES                  RESULTS VERIFIED, PHONED TO AND READ BACK BY Apple Gamboa RN BY [facility] AT [phone]           Culture result: [place] TO [address] PANEL [address] [id number]    BLOOD CULTURE ID PANEL [[id number]]  (Abnormal) Collected:  09/07/19 0839    Order Status:  Completed Specimen:  Blood Updated:  09/08/19 0848     Acc. no. from Micro Order [id number]     Klebsiella pneumoniae DETECTED        Comment: RESULTS VERIFIED, PHONED TO AND READ BACK BY  BLANQUITA MCDONOUGH @ 0438 ON [id number] BY GEOFF          KPC (Carbapenem Resistance Gene) NOT DETECTED        Comment: WARNING:  A Not Detected result for the KPC gene does not indicate susceptibility to carbapenems. Gram negative bacteria can be resistant to carbapenems by mechanisms other than carrying the KPC gene. INTERPRETATION       Gram negative amy. Identified as Klebsiella pneumoniae. Clinical Consideration PENDING    BLOOD CULTURE ID PANEL [[id number]]  (Abnormal) Collected:  09/07/19 0838    Order Status:  Completed Specimen:  Blood Updated:  09/07/19 2237     Acc. no. from Micro Order [id number]     Klebsiella pneumoniae DETECTED        Comment: Arnulfo Robb RN BY LAW AT [phone]        KPC (Carbapenem Resistance Gene) NOT DETECTED        Comment: WARNING:  A Not Detected result for the KPC gene does not indicate susceptibility to carbapenems. Gram negative bacteria can be resistant to carbapenems by mechanisms other than carrying the KPC gene.         Clinical Consideration PENDING          Labs: Results:       BMP, Mg, Phos Recent Labs     09/09/19 0636 09/08/19 0611 09/07/19  0710    142 139   K 3.2* 3.7 4.0   * 110* 105   CO2 25 25 28   AGAP 8 7 6*   BUN 9 11 13   CREA 0.85 0.85 1.07*   CA 8.2* 9.0 9.3   * 92 170*      CBC Recent Labs     09/09/19 0636 09/08/19  0611 09/07/19  0710   WBC 7.7 14.6* 18.1*   RBC 4.29 4.30 5.47*   HGB 12.3 12.2 15.9*   HCT 37.7 37.7 48.3*    258 372   GRANS 71 84* 92*   LYMPH 15 8* 5*   EOS 5 0* 0*   MONOS 9 7 3*   BASOS 0 0 0   IG 0 0 0   ANEU 5.5 12.3* 16.6*   ABL 1.1 1.2 0.8   MIGUEL A 0.4 0.0 0.1   ABM 0.7 1.0 0.5   ABB 0.0 0.0 0.1   AIG 0.0 0.0 0.1      LFT Recent Labs     09/09/19 0636 09/08/19 0611 09/07/19  0710   SGOT 45* 84* 288*   * 153* 297*   * 299* 453*   TP 5.9* 5.9* 7.9   ALB 2.6* 2.8* 3.9   GLOB 3.3 3.1 4.0*   AGRAT 0.8* 0.9* 1.0*      Cardiac Testing Lab Results   Component Value Date/Time    Troponin-I, Qt. <0.02 (L) 08/06/2019 03:58 PM    Troponin-I, Qt. <0.02 (L) 08/05/2019 10:57 AM      Coagulation Tests Lab Results Component Value Date/Time    Prothrombin time 14.3 09/08/2019 06:11 AM    INR 1.1 09/08/2019 06:11 AM    aPTT 31.1 09/08/2019 06:11 AM      A1c No results found for: HBA1C, HGBE8, TMT5PDLN   Lipid Panel No results found for: CHOL, CHOLPOCT, CHOLX, CHLST, CHOLV, 084274, HDL, LDL, LDLC, DLDLP, 233970, VLDLC, VLDL, TGLX, TRIGL, TRIGP, TGLPOCT, CHHD, CHHDX   Thyroid Panel No results found for: TSH, T4, FT4, TT3, T3U, TSHEXT     Most Recent UA No results found for: COLOR, APPRN, REFSG, YAS, PROTU, GLUCU, KETU, BILU, BLDU, UROU, MINDY, LEUKU     Allergies   Allergen Reactions    Codeine Other (comments)     Stomach ache    Doxycycline Other (comments)     Stomach aches    Flagyl [Metronidazole] Itching    Penicillins Rash    Potassium Nausea Only       There is no immunization history on file for this patient. All Labs from Last 24 Hrs:  Recent Results (from the past 24 hour(s))   METABOLIC PANEL, COMPREHENSIVE    Collection Time: 09/09/19  6:36 AM   Result Value Ref Range    Sodium 142 136 - 145 mmol/L    Potassium 3.2 (L) 3.5 - 5.1 mmol/L    Chloride 109 (H) 98 - 107 mmol/L    CO2 25 21 - 32 mmol/L    Anion gap 8 7 - 16 mmol/L    Glucose 108 (H) 65 - 100 mg/dL    BUN 9 8 - 23 MG/DL    Creatinine 0.85 0.6 - 1.0 MG/DL    GFR est AA >60 >60 ml/min/1.73m2    GFR est non-AA >60 >60 ml/min/1.73m2    Calcium 8.2 (L) 8.3 - 10.4 MG/DL    Bilirubin, total 2.4 (H) 0.2 - 1.1 MG/DL    ALT (SGPT) 112 (H) 12 - 65 U/L    AST (SGOT) 45 (H) 15 - 37 U/L    Alk.  phosphatase 289 (H) 50 - 136 U/L    Protein, total 5.9 (L) 6.3 - 8.2 g/dL    Albumin 2.6 (L) 3.2 - 4.6 g/dL    Globulin 3.3 2.3 - 3.5 g/dL    A-G Ratio 0.8 (L) 1.2 - 3.5     CBC WITH AUTOMATED DIFF    Collection Time: 09/09/19  6:36 AM   Result Value Ref Range    WBC 7.7 4.3 - 11.1 K/uL    RBC 4.29 4.05 - 5.2 M/uL    HGB 12.3 11.7 - 15.4 g/dL    HCT 37.7 35.8 - 46.3 %    MCV 87.9 79.6 - 97.8 FL    MCH 28.7 26.1 - 32.9 PG    MCHC 32.6 31.4 - 35.0 g/dL    RDW 14.4 11.9 - 14.6 %    PLATELET 859 428 - 801 K/uL    MPV 10.2 9.4 - 12.3 FL    ABSOLUTE NRBC 0.00 0.0 - 0.2 K/uL    DF AUTOMATED      NEUTROPHILS 71 43 - 78 %    LYMPHOCYTES 15 13 - 44 %    MONOCYTES 9 4.0 - 12.0 %    EOSINOPHILS 5 0.5 - 7.8 %    BASOPHILS 0 0.0 - 2.0 %    IMMATURE GRANULOCYTES 0 0.0 - 5.0 %    ABS. NEUTROPHILS 5.5 1.7 - 8.2 K/UL    ABS. LYMPHOCYTES 1.1 0.5 - 4.6 K/UL    ABS. MONOCYTES 0.7 0.1 - 1.3 K/UL    ABS. EOSINOPHILS 0.4 0.0 - 0.8 K/UL    ABS. BASOPHILS 0.0 0.0 - 0.2 K/UL    ABS. IMM. GRANS. 0.0 0.0 - 0.5 K/UL       Discharge Exam:  Patient Vitals for the past 24 hrs:   Temp Pulse Resp BP SpO2   09/09/19 1156 98.1 °F (36.7 °C) (!) 58 20 118/81 96 %   09/09/19 0748 98.1 °F (36.7 °C) (!) 58 18 113/70 96 %   09/09/19 0540 98.2 °F (36.8 °C) 71 18 108/66 95 %   09/09/19 0400  (!) 47      09/09/19 0051 97.8 °F (36.6 °C) 77 18 122/70 95 %   09/09/19 0000  (!) 59      09/08/19 2036 98.1 °F (36.7 °C) 61 18 121/61 95 %   09/08/19 2000  62      09/08/19 1642 98.8 °F (37.1 °C) 72 17 114/70 93 %     Oxygen Therapy  O2 Sat (%): 96 % (09/09/19 1156)  Pulse via Oximetry: 73 beats per minute (09/07/19 1536)  O2 Device: Nasal cannula (09/07/19 1536)  O2 Flow Rate (L/min): 2 l/min (09/07/19 1536)    Intake/Output Summary (Last 24 hours) at 9/9/2019 1230  Last data filed at 9/9/2019 0977  Gross per 24 hour   Intake 240 ml   Output    Net 240 ml         Physical exam:  General:    Well nourished. Alert. No distress. Eyes:   Normal sclera. Extraocular movements intact. ENT:  Normocephalic, atraumatic. Moist mucous membranes  CV:   Regular rate and rhythm. No murmur, rub, or gallop. Lungs:  Clear to auscultation bilaterally. No wheezing, rhonchi, or rales. Abdomen: Soft, nontender, nondistended. Bowel sounds normal.   Extremities: Warm and dry. No cyanosis or edema. Neurologic: No focal deficits  Skin:     No rashes or jaundice. Psych:  Normal mood and affect.     Discharge Info:   Current Discharge Medication List      START taking these medications    Details   levoFLOXacin (LEVAQUIN) 750 mg tablet Take 1 Tab by mouth every fourty-eight (48) hours. Qty: 4 Tab, Refills: 0         CONTINUE these medications which have NOT CHANGED    Details   rivaroxaban (XARELTO) 20 mg tab tablet Take 1 Tab by mouth daily (with dinner). Qty: 30 Tab, Refills: 11      omeprazole (PRILOSEC) 20 mg capsule 1 capsule bid  Qty: 60 Cap, Refills: 5    Associated Diagnoses: Gastroesophageal reflux disease, esophagitis presence not specified      raNITIdine (ZANTAC) 150 mg tablet Take 150 mg by mouth two (2) times a day. potassium chloride (K-DUR, KLOR-CON) 20 mEq tablet TAKE 1 TABLET BY MOUTH DAILY  Qty: 90 Tab, Refills: 1    Comments: **Patient requests 90 days supply**      Biotin 2,500 mcg cap Take  by mouth. Indications: patient takes med every other day               Disposition: home    Activity: Activity as tolerated  Diet: DIET REGULAR    Follow-up Appointments   Procedures    FOLLOW UP VISIT Appointment in: One Week Follow up primary care next one week Follow up gastroenterology as directed     Follow up primary care next one week  Follow up gastroenterology as directed     Standing Status:   Standing     Number of Occurrences:   1     Order Specific Question:   Appointment in     Answer: One Week         Follow-up Information     Follow up With Specialties Details Why Contact Info    Apolonia Laguna Physician Assistant   [address] [place] [address] Divya Pickett [phone]  [phone]                Time spent in patient discharge planning and coordination 44 minutes.     Signed:  Padmini Pitts

## 2019-09-09 NOTE — PROGRESS NOTES
Pt continues to have Gerd type burning to stomach. Attempted a few crackers to see if would feel better. MD made aware. Reglan prescribed and given. Hourly rounds performed through shift, pt denies needs at this time. Bed in low position and call light/ personal items within reach. Will continue to monitor and report to day shift nurse.

## 2019-09-10 LAB
ACC. NO. FROM MICRO ORDER, ACCP: ABNORMAL
INTERPRETATION: ABNORMAL
KLEBSIELLA PNEUMONIAE: DETECTED
KPC (CARBAPENEM RESISTANCE GENE): NOT DETECTED

## 2019-09-16 ENCOUNTER — ANESTHESIA EVENT (OUTPATIENT)
Dept: ENDOSCOPY | Age: 69
End: 2019-09-16
Payer: MEDICARE

## 2019-09-16 NOTE — CDMP QUERY
Pt admitted with obstructed cbd stent and stone with clinical cholangitis /Pt noted to have klebsiella bacteremia.  If possible, please document in d/c summary if you are evaluating and / or treating any of the following:     Sepsis, present on admission, suspected or probable causative organism (please specify)   Sepsis, now resolved, suspected or probable causative organism (please specify)   Sepsis, not present on admission   No Sepsis, suspected or probable localized infection (please specify)   Sepsis was ruled out    Other, please specify   Clinically unable to determine    The medical record reflects the following:     Risk Factors: 68 female, recent ERCP with stent to CBD       Clinical Indicators: No temperature, No increased HR, Lactic acid 1.18 per documentation on H & P \" Wbc 18.1k with left shift but no sirs criteria\" Blood culture + klebsiella bacteremia,         Treatment:  IV levaquin then discharged on PO levaquin        Thanks,  Rhys Chen RN  [employer] Documentation Management Program  [phone]

## 2019-09-18 ENCOUNTER — ANESTHESIA (OUTPATIENT)
Dept: ENDOSCOPY | Age: 69
End: 2019-09-18
Payer: MEDICARE

## 2019-09-18 ENCOUNTER — APPOINTMENT (OUTPATIENT)
Dept: GENERAL RADIOLOGY | Age: 69
End: 2019-09-18
Attending: INTERNAL MEDICINE
Payer: MEDICARE

## 2019-09-18 ENCOUNTER — HOSPITAL ENCOUNTER (OUTPATIENT)
Age: 69
Setting detail: OUTPATIENT SURGERY
Discharge: HOME | End: 2019-09-18
Attending: INTERNAL MEDICINE | Admitting: INTERNAL MEDICINE
Payer: MEDICARE

## 2019-09-18 VITALS
WEIGHT: 124 LBS | OXYGEN SATURATION: 94 % | DIASTOLIC BLOOD PRESSURE: 67 MMHG | SYSTOLIC BLOOD PRESSURE: 129 MMHG | RESPIRATION RATE: 16 BRPM | HEIGHT: 62 IN | TEMPERATURE: 98.2 F | BODY MASS INDEX: 22.82 KG/M2 | HEART RATE: 74 BPM

## 2019-09-18 PROCEDURE — 74011000250 HC RX REV CODE- 250

## 2019-09-18 PROCEDURE — 74011250636 HC RX REV CODE- 250/636

## 2019-09-18 PROCEDURE — 74011000250 HC RX REV CODE- 250: Performed by: ANESTHESIOLOGY

## 2019-09-18 PROCEDURE — 74011250636 HC RX REV CODE- 250/636: Performed by: ANESTHESIOLOGY

## 2019-09-18 PROCEDURE — 77030013991 HC SNR POLYP ENDOSC BSC -A: Performed by: INTERNAL MEDICINE

## 2019-09-18 PROCEDURE — 74011250636 HC RX REV CODE- 250/636: Performed by: INTERNAL MEDICINE

## 2019-09-18 PROCEDURE — 76060000034 HC ANESTHESIA 1.5 TO 2 HR: Performed by: INTERNAL MEDICINE

## 2019-09-18 PROCEDURE — 77030040361 HC SLV COMPR DVT MDII -B: Performed by: INTERNAL MEDICINE

## 2019-09-18 PROCEDURE — C1769 GUIDE WIRE: HCPCS | Performed by: INTERNAL MEDICINE

## 2019-09-18 PROCEDURE — 77030009038 HC CATH BILI STN RTVR BSC -C: Performed by: INTERNAL MEDICINE

## 2019-09-18 PROCEDURE — 74330 X-RAY BILE/PANC ENDOSCOPY: CPT

## 2019-09-18 PROCEDURE — 77030007288 HC DEV LOK BILI BSC -A: Performed by: INTERNAL MEDICINE

## 2019-09-18 PROCEDURE — 74011636320 HC RX REV CODE- 636/320: Performed by: INTERNAL MEDICINE

## 2019-09-18 PROCEDURE — 77030037088 HC TUBE ENDOTRACH ORAL NSL COVD-A: Performed by: ANESTHESIOLOGY

## 2019-09-18 PROCEDURE — C1726 CATH, BAL DIL, NON-VASCULAR: HCPCS | Performed by: INTERNAL MEDICINE

## 2019-09-18 PROCEDURE — 76040000027: Performed by: INTERNAL MEDICINE

## 2019-09-18 PROCEDURE — 77030039425 HC BLD LARYNG TRULITE DISP TELE -A: Performed by: ANESTHESIOLOGY

## 2019-09-18 RX ORDER — CIPROFLOXACIN 2 MG/ML
400 INJECTION, SOLUTION INTRAVENOUS EVERY 12 HOURS
Status: DISCONTINUED | OUTPATIENT
Start: 2019-09-18 | End: 2019-09-18

## 2019-09-18 RX ORDER — HYDROMORPHONE HYDROCHLORIDE 2 MG/ML
0.5 INJECTION, SOLUTION INTRAMUSCULAR; INTRAVENOUS; SUBCUTANEOUS
Status: DISCONTINUED | OUTPATIENT
Start: 2019-09-18 | End: 2019-09-18 | Stop reason: HOSPADM

## 2019-09-18 RX ORDER — FLUMAZENIL 0.1 MG/ML
0.2 INJECTION INTRAVENOUS
Status: DISCONTINUED | OUTPATIENT
Start: 2019-09-18 | End: 2019-09-18 | Stop reason: HOSPADM

## 2019-09-18 RX ORDER — DIPHENHYDRAMINE HYDROCHLORIDE 50 MG/ML
12.5 INJECTION, SOLUTION INTRAMUSCULAR; INTRAVENOUS
Status: DISCONTINUED | OUTPATIENT
Start: 2019-09-18 | End: 2019-09-18 | Stop reason: HOSPADM

## 2019-09-18 RX ORDER — ROCURONIUM BROMIDE 10 MG/ML
INJECTION, SOLUTION INTRAVENOUS AS NEEDED
Status: DISCONTINUED | OUTPATIENT
Start: 2019-09-18 | End: 2019-09-18 | Stop reason: HOSPADM

## 2019-09-18 RX ORDER — SODIUM CHLORIDE, SODIUM LACTATE, POTASSIUM CHLORIDE, CALCIUM CHLORIDE 600; 310; 30; 20 MG/100ML; MG/100ML; MG/100ML; MG/100ML
100 INJECTION, SOLUTION INTRAVENOUS CONTINUOUS
Status: DISCONTINUED | OUTPATIENT
Start: 2019-09-18 | End: 2019-09-18 | Stop reason: HOSPADM

## 2019-09-18 RX ORDER — CIPROFLOXACIN 2 MG/ML
400 INJECTION, SOLUTION INTRAVENOUS
Status: COMPLETED | OUTPATIENT
Start: 2019-09-18 | End: 2019-09-18

## 2019-09-18 RX ORDER — SODIUM CHLORIDE 0.9 % (FLUSH) 0.9 %
5-40 SYRINGE (ML) INJECTION AS NEEDED
Status: DISCONTINUED | OUTPATIENT
Start: 2019-09-18 | End: 2019-09-18 | Stop reason: HOSPADM

## 2019-09-18 RX ORDER — LIDOCAINE HYDROCHLORIDE 20 MG/ML
INJECTION, SOLUTION EPIDURAL; INFILTRATION; INTRACAUDAL; PERINEURAL AS NEEDED
Status: DISCONTINUED | OUTPATIENT
Start: 2019-09-18 | End: 2019-09-18 | Stop reason: HOSPADM

## 2019-09-18 RX ORDER — SUCCINYLCHOLINE CHLORIDE 20 MG/ML
INJECTION INTRAMUSCULAR; INTRAVENOUS AS NEEDED
Status: DISCONTINUED | OUTPATIENT
Start: 2019-09-18 | End: 2019-09-18 | Stop reason: HOSPADM

## 2019-09-18 RX ORDER — OXYCODONE HYDROCHLORIDE 5 MG/1
5 TABLET ORAL
Status: DISCONTINUED | OUTPATIENT
Start: 2019-09-18 | End: 2019-09-18 | Stop reason: HOSPADM

## 2019-09-18 RX ORDER — SODIUM CHLORIDE 0.9 % (FLUSH) 0.9 %
5-40 SYRINGE (ML) INJECTION EVERY 8 HOURS
Status: DISCONTINUED | OUTPATIENT
Start: 2019-09-18 | End: 2019-09-18 | Stop reason: HOSPADM

## 2019-09-18 RX ORDER — OXYCODONE HYDROCHLORIDE 5 MG/1
10 TABLET ORAL
Status: DISCONTINUED | OUTPATIENT
Start: 2019-09-18 | End: 2019-09-18 | Stop reason: HOSPADM

## 2019-09-18 RX ORDER — ACETAMINOPHEN 500 MG
1000 TABLET ORAL ONCE
Status: DISCONTINUED | OUTPATIENT
Start: 2019-09-18 | End: 2019-09-18 | Stop reason: HOSPADM

## 2019-09-18 RX ORDER — ONDANSETRON 2 MG/ML
INJECTION INTRAMUSCULAR; INTRAVENOUS AS NEEDED
Status: DISCONTINUED | OUTPATIENT
Start: 2019-09-18 | End: 2019-09-18 | Stop reason: HOSPADM

## 2019-09-18 RX ORDER — PROPOFOL 10 MG/ML
INJECTION, EMULSION INTRAVENOUS AS NEEDED
Status: DISCONTINUED | OUTPATIENT
Start: 2019-09-18 | End: 2019-09-18 | Stop reason: HOSPADM

## 2019-09-18 RX ORDER — DEXAMETHASONE SODIUM PHOSPHATE 4 MG/ML
INJECTION, SOLUTION INTRA-ARTICULAR; INTRALESIONAL; INTRAMUSCULAR; INTRAVENOUS; SOFT TISSUE AS NEEDED
Status: DISCONTINUED | OUTPATIENT
Start: 2019-09-18 | End: 2019-09-18 | Stop reason: HOSPADM

## 2019-09-18 RX ORDER — NALOXONE HYDROCHLORIDE 0.4 MG/ML
0.2 INJECTION, SOLUTION INTRAMUSCULAR; INTRAVENOUS; SUBCUTANEOUS AS NEEDED
Status: DISCONTINUED | OUTPATIENT
Start: 2019-09-18 | End: 2019-09-18 | Stop reason: HOSPADM

## 2019-09-18 RX ADMIN — PROPOFOL 50 MG: 10 INJECTION, EMULSION INTRAVENOUS at 12:37

## 2019-09-18 RX ADMIN — PROPOFOL 50 MG: 10 INJECTION, EMULSION INTRAVENOUS at 12:32

## 2019-09-18 RX ADMIN — ROCURONIUM BROMIDE 5 MG: 10 INJECTION, SOLUTION INTRAVENOUS at 12:19

## 2019-09-18 RX ADMIN — DEXAMETHASONE SODIUM PHOSPHATE 4 MG: 4 INJECTION, SOLUTION INTRA-ARTICULAR; INTRALESIONAL; INTRAMUSCULAR; INTRAVENOUS; SOFT TISSUE at 12:44

## 2019-09-18 RX ADMIN — ONDANSETRON 4 MG: 2 INJECTION INTRAMUSCULAR; INTRAVENOUS at 12:44

## 2019-09-18 RX ADMIN — LIDOCAINE HYDROCHLORIDE 60 MG: 20 INJECTION, SOLUTION EPIDURAL; INFILTRATION; INTRACAUDAL; PERINEURAL at 12:19

## 2019-09-18 RX ADMIN — SODIUM CHLORIDE, SODIUM LACTATE, POTASSIUM CHLORIDE, AND CALCIUM CHLORIDE 100 ML/HR: 600; 310; 30; 20 INJECTION, SOLUTION INTRAVENOUS at 11:22

## 2019-09-18 RX ADMIN — CIPROFLOXACIN 400 MG: 2 INJECTION, SOLUTION INTRAVENOUS at 12:15

## 2019-09-18 RX ADMIN — PROPOFOL 50 MG: 10 INJECTION, EMULSION INTRAVENOUS at 12:54

## 2019-09-18 RX ADMIN — IOPAMIDOL 10 ML: 755 INJECTION, SOLUTION INTRAVENOUS at 13:03

## 2019-09-18 RX ADMIN — SUCCINYLCHOLINE CHLORIDE 120 MG: 20 INJECTION INTRAMUSCULAR; INTRAVENOUS at 12:19

## 2019-09-18 RX ADMIN — PROPOFOL 200 MG: 10 INJECTION, EMULSION INTRAVENOUS at 12:19

## 2019-09-18 RX ADMIN — PROMETHAZINE HYDROCHLORIDE 6.25 MG: 25 INJECTION INTRAMUSCULAR; INTRAVENOUS at 14:08

## 2019-09-18 RX ADMIN — SODIUM CHLORIDE, SODIUM LACTATE, POTASSIUM CHLORIDE, AND CALCIUM CHLORIDE: 600; 310; 30; 20 INJECTION, SOLUTION INTRAVENOUS at 12:09

## 2019-09-18 NOTE — ANESTHESIA POSTPROCEDURE EVALUATION
Procedure(s):  ENDOSCOPIC RETROGRADE CHOLANGIOPANCREATOGRAPHY W/ STENT PLACEMENT/ BMI 23  ENDOSCOPIC STONE EXTRACTION/BALLOON SWEEP  ENDOSCOPY WITH PROSTHESIS OR STENT REMOVAL  BILIARY DILATATION. general    Anesthesia Post Evaluation      Multimodal analgesia: multimodal analgesia used between 6 hours prior to anesthesia start to PACU discharge  Patient location during evaluation: bedside  Patient participation: complete - patient participated  Level of consciousness: awake and alert  Pain score: 3  Pain management: adequate  Airway patency: patent  Anesthetic complications: no  Cardiovascular status: acceptable and hemodynamically stable  Respiratory status: acceptable  Hydration status: acceptable  Post anesthesia nausea and vomiting:  none      Vitals Value Taken Time   /58 9/18/2019  2:19 PM   Temp     Pulse 61 9/18/2019  2:21 PM   Resp 25 9/18/2019  2:21 PM   SpO2 95 % 9/18/2019  2:21 PM   Vitals shown include unvalidated device data.

## 2019-09-18 NOTE — H&P
[facility] Pre [facility] H and P          Chief Complaint:  CBD stones    Subjective:     History of Present Illness:  Patient is a 68 y.o. Woman who presents for outpatient ERCP for known CBD stones     PMH:  Past Medical History:   Diagnosis Date    A-fib [facility])     Adverse effect of anesthesia     went into A fib during ERCP 08/2019    Cholangitis     due to bile duct calculus    Depression     GERD (gastroesophageal reflux disease)     zantac as needed    History of echocardiogram 08/2019    EF 60-65%- mild left atrial enlargement, MMR    Klebsiella sepsis ([facility]     Smoker     1 ppd x 50 years       PSH:  Past Surgical History:   Procedure Laterality Date    COLONOSCOPY N/A 7/10/2019    COLONOS[facility] performed [facility] Tiny [facility] MD [facility] ERCP  9/7/2019         HX GI  08/2019    ERCP     HX GI  09/2019    ERCP    FL COLONOSCOPY FLX DX W/COLLJ SPEC WHEN PFRMD  7/10/2019         FL EGD TRANSORAL BIOPSY SINGLE/MULTIPLE  7/10/2019            Allergies: Allergies   Allergen Reactions    Adhesive Rash    Codeine Other (comments)     Stomach ache    Doxycycline Other (comments)     Stomach aches    Flagyl [Metronidazole] Itching    Penicillins Rash    Potassium Nausea Only       Home Medications:  Prior to Admission medications    Medication Sig Start Date End Date Taking? Authorizing Provider   raNITIdine (ZANTAC) 150 mg tablet Take 150 mg by mouth as needed for Indigestion. Yes Provider, Historical   Biotin 2,500 mcg cap Take  by mouth. Indications: patient takes med every other day   Yes Provider, Historical   rivaroxaban (XARELTO) 20 mg tab tablet Take 1 Tab by mouth daily (with dinner).  9/5/19   Tamanna Elizondo MD       Hospital Medications:  Current Facility-Administered Medications   Medication Dose Route Frequency    lactated Ringers infusion  100 mL/hr IntraVENous CONTINUOUS    iopamidol (ISOVUE-370) 76 % injection 125 mL  125 mL IntraBILiary RAD ONCE    glucagon (GLUCAGEN) injection 0.5-1 mg  0.5-1 mg IntraVENous Multiple    indomethacin (INDOCIN) rectal suppository 100 mg  100 mg Rectal ENDO ONCE       Social History:  Social History     Tobacco Use    Smoking status: Current Every Day Smoker     Packs/day: 1.00     Years: 50.00     Pack years: 50.00    Smokeless tobacco: Never Used   Substance Use Topics    Alcohol use: Not Currently     Pt denies any history of IV drug use, blood transfusions, tattoos     Family History:  Family History   Problem Relation Age of Onset    Cancer Mother     COPD Mother     No Known Problems Sister     No Known Problems Brother        Review of Systems:  A detailed 10 system ROS is obtained, with pertinent positives as listed above. All others are negative. Diet:      Objective:     Physical Exam:  Vitals:  Visit Vitals  /67   Pulse 65   Temp 97.6 °F (36.4 °C)   Resp 16   Ht 5' 2\" (1.575 m)   Wt 56.2 kg (124 lb)   SpO2 97%   BMI 22.68 kg/m²     Gen:  Pt is alert, cooperative, no acute distress  Skin:  Extremities and face reveal no rashes. HEENT: Sclerae anicteric. Extra-occular muscles are intact. No oral ulcers. The neck is supple. Cardiovascular: Regular rate and rhythm. No murmurs, gallops, or rubs. Respiratory:  Comfortable breathing with no accessory muscle use. Clear breath sounds anteriorly with no wheezes, rales, or rhonchi. GI:  Abdomen nondistended, soft, and nontender. Normal active bowel sounds. No masses palpable. Musculoskeletal:  Extremities have good range of motion. No costovertebral tenderness. Neurological:  Gross memory appears intact. Patient is alert and oriented. Psychiatric:  Mood appears appropriate with judgement intact. Lymphatic:  No cervical or supraclavicular adenopathy. Assessment:       Active Problems:    * No active hospital problems. *      Plan:       Proceed to ERCP as planned.  Will give antibiotics during procedure

## 2019-09-18 NOTE — DISCHARGE INSTRUCTIONS
Endoscopic Retrograde Cholangiopancreatogram (ERCP): What to Expect at [facility]  After you have an endoscopic retrograde cholangiopancreatogram (ERCP). You will be able to go home after your doctor or a nurse checks to make sure you are not having any problems. If you stay in the hospital overnight, you may go home the next day. You may have a sore throat for a day or two after the procedure. This care sheet gives you a general idea about how long it will take for you to recover. But each person recovers at a different pace. Follow the steps below to get better as quickly as possible. How can you care for yourself at home? Activity  · Rest as much as you need to after you go home. · You should be able to go back to your usual activities the day after the procedure. Diet  · Follow your doctor's directions for eating after the procedure. · Drink plenty of fluids (unless your doctor tells you not to). Medicines  · If you have a sore throat the next day, use an over-the-counter spray to numb your throat. Follow-up care is a key part of your treatment and safety. Be sure to make and go to all appointments, and call your doctor if you are having problems. Its also a good idea to know your test results and keep a list of the medicines you take. When should you call for help? Call 911 anytime you think you may need emergency care. For example, call if:  · You vomit blood or what looks like coffee grounds. · You pass maroon or bloody stools. Call your doctor now or go to the emergency room if:  · You have trouble swallowing. · You have belly pain. · Your stools are black and tarlike or have streaks of blood. · You are sick to your stomach and cannot drink fluids. · You have a fever. · You have pain that does not get better after you take your pain medicine. Watch closely for changes in your health, and be sure to contact your doctor if:  · Your throat still hurts after a day or two.   You do not get better as expected. After general anesthesia or intravenous sedation, for 24 hours or while taking prescription Narcotics:  · Limit your activities  · A responsible adult needs to be with you for the next 24 hours  · Do not drive and operate hazardous machinery  · Do not make important personal or business decisions  · Do  not drink alcoholic beverages  · If you have not urinated within 8 hours after discharge, please contact your surgeon on call. · If you have sleep apnea and have a CPAP machine, please use it for all naps and sleeping. · Please use caution when taking narcotics and any of your home medications that may cause drowsiness. *  Please give a list of your current medications to your Primary Care Provider. *  Please update this list whenever your medications are discontinued, doses are      changed, or new medications (including over-the-counter products) are added. *  Please carry medication information at all times in case of emergency situations. These are general instructions for a healthy lifestyle:  No smoking/ No tobacco products/ Avoid exposure to second hand smoke  Surgeon General's Warning:  Quitting smoking now greatly reduces serious risk to your health.   Obesity, smoking, and sedentary lifestyle greatly increases your risk for illness  A healthy diet, regular physical exercise & weight monitoring are important for maintaining a healthy lifestyle

## 2019-09-18 NOTE — PERIOP NOTES
Dr. Shawnee Silva at bedside; pt received 500ml of 1L LR bolus; pt is feeling well and is ready to go home; ok received from MD to d/c pt home

## 2019-09-18 NOTE — ANESTHESIA PREPROCEDURE EVALUATION
Relevant Problems   No relevant active problems       Anesthetic History   No history of anesthetic complications            Review of Systems / Medical History  Patient summary reviewed and pertinent labs reviewed    Pulmonary    COPD: moderate      Smoker (1 ppd x 50 years)         Neuro/Psych   Within defined limits           Cardiovascular            Dysrhythmias : atrial fibrillation      Exercise tolerance: >4 METS  Comments: Denies CP, SOB or changes in functional status   GI/Hepatic/Renal     GERD: well controlled           Endo/Other  Within defined limits           Other Findings   Comments: Bile duct obstruction with elevated LFTs  Had A fib during first ERCP           Physical Exam    Airway  Mallampati: II  TM Distance: 4 - 6 cm  Neck ROM: normal range of motion   Mouth opening: Normal     Cardiovascular  Regular rate and rhythm,  S1 and S2 normal,  no murmur, click, rub, or gallop  Rhythm: regular  Rate: normal         Dental    Dentition: Bridges     Pulmonary  Breath sounds clear to auscultation               Abdominal  GI exam deferred       Other Findings            Anesthetic Plan    ASA: 3  Anesthesia type: general          Induction: Intravenous  Anesthetic plan and risks discussed with: Patient

## 2019-09-18 NOTE — PROCEDURES
PROCEDURE: Endoscopic Retrograde Cholangiopancreatogram    DATE of PROCEDURE: 9/18/2019    INDICATION: 68year old woman with known choledocholithiasis, s/p ERCP 8/6/19 and 9/7/19 with partial removal of stones and placement of 8.5 Fr biliary stent     POSTPROCEDURE DIAGNOSIS:  1. Choledocholithiasis       MEDICATIONS ADMINISTERED:  GETA, 400 mg IV cipro     INSTRUMENT: [id number]    PROCEDURE:  After obtaining informed consent, the patient was placed in prone position on the fluoroscopy table. The patient was then sedated. The endoscope was passed under indirect technique to the oropharynx and gently passed into the esophagus. The endoscope was passed to the ampulla. Limited views of the stomach and duodenum were obtained. After the procedure, the patient was taken to the recovery area in stable condition. Ampulla: the ampulla was located in the expected position. There was a previously placed biliary stent which was removed with the snare device. There was a lot of edema around the ampulla     Cannulation: Cannulation performed on first attempt with the 9-12 mm biliary extraction balloon preloaded with a 0.035 inch guidewire which was used for deep cannulation of the biliary tree. Contrast was injected resulting in complete opacification. Cholangiogram: initial cholangiogram demonstrated filling defects in the mid and distal common bile duct, as well as a round filling defect in the common hepatic duct, consistent with stones. Interventions:  I initially cleared the common bile duct, but not the common hepatic duct. The common bile duct was swept with the biliary extraction balloon, initial sweeps produced three small stones measuring 5 mm. There was mild stenosis at the ampulla due to periampullary edema. A biliary sphincteroplasty was performed with a 8 mm and then 10 mm biliary dilation balloon. Next, this was exchanged for the biliary extraction balloon.  Biliary sweeps performed starting at the bifurcation, which produced one 8 mm round green stone. Final sweeps were clear. Contrast was draining well from the bile ducts. Occulusion cholangiogram was subsequently performed and was normal.     Estimated Blood Loss: 0 cc-min    ASSESSMENT:  1. Choledocholithiasis       PLAN:  1. Follow up with referring MD   2.  Follow up with surgery to discuss cholecystectomy         Senait Casanova MD

## 2019-09-24 RX ORDER — CEFAZOLIN SODIUM 1 G/3ML
2-3 INJECTION, POWDER, FOR SOLUTION INTRAMUSCULAR; INTRAVENOUS
Status: CANCELLED | OUTPATIENT
Start: 2019-09-24 | End: 2019-09-24

## 2019-09-24 NOTE — H&P (VIEW-ONLY)
Faye Bradley MD, [address] [facility], [address] [place] Phone [phone]   Fax [phone] Date of visit: 9/24/2019 Primary/Requesting provider: Claus Stone MD 
 
Chief Complaint Patient presents with  Follow-up  
  discuss lap cholectomy per ER visits HISTORY OF PRESENT ILLNESS Filiberto Snyder is a 68 y.o. female. Follow-up Patient is a 68 y.o. female who presents for further discussion of cholecystectomy. She was originally seen in the spring to consider cholecystectomy. After discussion, it was felt her symptoms were not strongly suggestive of a gallbladder source. She underwent EGD (and was due for colo, too), which revealed a large (15cm?) hiatal hernia and mild to moderate gastritis which was likely biliary. Due to findings, cholecystectomy was not recommended. She continued to have some upper abdominal pain and chest pain- what she refers to as reflux. In early August she had worsening symptoms, including worsening RUQ pain and was found to have choledocholithiasis. She underwent stent placement but the CBD stone could not be removed. She underwent repeat ERCP 9/6 with stent removal and stone extraction with complete duct clearance. She continues to have tenderness in the RUQ and some heartburn and is very eager to proceed with cholecystectomy. She has not been having fever, chills, nausea, vomiting, jaundice, spontaneous regurgitation, or weight loss. She is taking zantac 150 BID for her GERD, which helps some. She was taken off of omeprazole due to hypokalemia, for which it was blamed, and because she did not tolerate oral potassium replacement. She is also currently on Xarelto for an episode of afib, at the time of her initial stone presentation, which has not recurred and which she does not really think she needs nor does she plan to stay on it. Medications:  
Current Outpatient Medications Medication Sig  
  raNITIdine (ZANTAC) 150 mg tablet Take 150 mg by mouth as needed for Indigestion.  rivaroxaban (XARELTO) 20 mg tab tablet Take 1 Tab by mouth daily (with dinner).  Biotin 2,500 mcg cap Take  by mouth. Indications: patient takes med every other day No current facility-administered medications for this visit. Allergies: Allergies Allergen Reactions  Adhesive Rash  Codeine Other (comments) Stomach ache  Doxycycline Other (comments) Stomach aches  Flagyl [Metronidazole] Itching  Penicillins Rash  Potassium Nausea Only Past History: 
Past Medical History:  
Diagnosis Date  [facility] ([facility]  Adverse effect of anesthesia   
 went into A fib during ERCP 08/2019  Cholangitis   
 due to bile duct calculus  Depression  GERD (gastroesophageal reflux disease)   
 zantac as needed  History of echocardiogram 08/2019 EF 60-65%- mild left atrial enlargement, MMR  
 Klebsiella sepsis ([facility]  Smoker 1 ppd x 50 years Past Surgical History:  
Procedure Laterality Date  COLONOSCOPY N/A 7/10/2019 COLONOSCOPY performed by Claudia Draper MD at [address] [place] [address]  ERCP  9/7/2019  HX GI  08/2019 ERCP   
 HX GI  09/2019 ERCP  
 AK COLONOSCOPY FLX DX W/COLLJ SPEC WHEN PFRMD  7/10/2019  AK EGD TRANSORAL BIOPSY SINGLE/MULTIPLE  7/10/2019 Family and Social History: 
Family History Problem Relation Age of Onset  Cancer Mother  COPD Mother  No Known Problems Sister  No Known Problems Brother Social History Socioeconomic History  Marital status:  Spouse name: Not on file  Number of children: Not on file  Years of education: Not on file  Highest education level: Not on file Occupational History  Not on file Social Needs  Financial resource strain: Not on file  Food insecurity:  
  Worry: Not on file Inability: Not on file  Transportation needs: Medical: Not on file Non-medical: Not on file Tobacco Use  Smoking status: Current Every Day Smoker Packs/day: 1.00 Years: 50.00 Pack years: 50.00  Smokeless tobacco: Never Used Substance and Sexual Activity  Alcohol use: Not Currently  Drug use: Never  Sexual activity: Not Currently Lifestyle  Physical activity:  
  Days per week: Not on file Minutes per session: Not on file  Stress: Not on file Relationships  Social connections:  
  Talks on phone: Not on file Gets together: Not on file Attends [employer] service: Not on file Active member of club or organization: Not on file Attends meetings of clubs or organizations: Not on file Relationship status: Not on file  Intimate partner violence:  
  Fear of current or ex partner: Not on file Emotionally abused: Not on file Physically abused: Not on file Forced sexual activity: Not on file Other Topics Concern  Not on file Social History Narrative  Not on file Review of Systems Constitutional: Negative. HENT: Negative. Eyes: Negative. Respiratory: Negative. Cardiovascular: Negative. Gastrointestinal: Positive for heartburn. Genitourinary: Negative. Musculoskeletal: Negative. Skin: Negative. Neurological: Negative. Endo/Heme/Allergies: Bruises/bleeds easily. Physical Exam  
Constitutional: She is oriented to person, place, and time. She appears well-developed and well-nourished. She is cooperative. Non-toxic appearance. HENT:  
Head: Normocephalic and atraumatic. Mouth/Throat: Oropharynx is clear and moist.  
Eyes: Pupils are equal, round, and reactive to light. Conjunctivae and EOM are normal. No scleral icterus. Neck: Normal range of motion. No JVD present. No tracheal deviation present. No thyromegaly present. Cardiovascular: Normal rate, regular rhythm and normal heart sounds.  Exam reveals no gallop and no friction rub. No murmur heard. Pulmonary/Chest: Effort normal and breath sounds normal. No respiratory distress. She has no wheezes. She has no rales. Abdominal: Soft. Bowel sounds are normal. There is tenderness (isolated to subcostal location in RUQ). There is no rebound and no guarding. Musculoskeletal: She exhibits no deformity. No gross deformities Neurological: She is alert and oriented to person, place, and time. No cranial nerve deficit. Skin: Skin is warm and dry. She is not diaphoretic. Psychiatric: She has a normal mood and affect. Her behavior is normal. Thought content normal.  
Vitals reviewed. Data: 
Reports of ERCP from 8/6, 9/7 when she presented with stent occlusion, and 9/18 when her duct was finally cleared are all reviewed. She has not had repeat LFTs since he duct was cleared. ASSESSMENT and PLAN Encounter Diagnoses Name Primary?  Gallstones Yes  Choledocholithiasis  Hiatal hernia  GERD without esophagitis It is clinically impossible to determine if her chest symptoms, what she terms \"reflux,\" is truly GERD or due to her gallbladder. She fully understands this. She is interested in hernia repair if it will alleviate her symptoms. At this time, I advise proceeding just with cholecystectomy. Afterwards, we can assess response, allow her to recover and (hopefully) enjoy fewer to no symptoms for a few months. If her GERD persists, then we could consider subsequent HH repair. She is so eager to proceed with surgery that I do not think she would be able to wait the typical few weeks for esophageal manometry that would be required if we wanted to pursue both at this time. She is in full agreement with the surgical plan. I have asked that she stay on her Xarelto for now, with the 2 day hold prior to surgery.   We discussed the rationale for anticoagulation and she expresses understanding of her increased risk of paroxysmal afib recurring within the 30-day postoperative period due to the physiologic stress of surgery. I have also encouraged her to meet with her PCP in the near future to come up with a definitive plan regarding any need for further cardiac workup and anticoagulation. She agrees to stay on it during her perioperative period. Will proceed with cholecystectomy. Technical details of the procedure are reviewed, including the non-standard single incision (SILS) technique if indicated. Risks reviewed include anesthetic risks, bleeding, infection, visceral injury including bile leak, incomplete symptom resolution and persistent post-operative diarrhea as well as conversion to open technique. All questions are answered.

## 2019-09-26 ENCOUNTER — ANESTHESIA EVENT (OUTPATIENT)
Dept: SURGERY | Age: 69
End: 2019-09-26
Payer: MEDICARE

## 2019-09-27 ENCOUNTER — ANESTHESIA (OUTPATIENT)
Dept: SURGERY | Age: 69
End: 2019-09-27
Payer: MEDICARE

## 2019-09-27 ENCOUNTER — HOSPITAL ENCOUNTER (OUTPATIENT)
Age: 69
Setting detail: OUTPATIENT SURGERY
Discharge: HOME | End: 2019-09-27
Attending: SURGERY | Admitting: SURGERY
Payer: MEDICARE

## 2019-09-27 VITALS
TEMPERATURE: 98.3 F | OXYGEN SATURATION: 91 % | RESPIRATION RATE: 16 BRPM | DIASTOLIC BLOOD PRESSURE: 67 MMHG | HEART RATE: 71 BPM | SYSTOLIC BLOOD PRESSURE: 122 MMHG

## 2019-09-27 DIAGNOSIS — K80.50 CHOLEDOCHOLITHIASIS: Primary | ICD-10-CM

## 2019-09-27 PROCEDURE — 74011250636 HC RX REV CODE- 250/636: Performed by: SURGERY

## 2019-09-27 PROCEDURE — 77030019940 HC BLNKT HYPOTHRM STRY -B: Performed by: ANESTHESIOLOGY

## 2019-09-27 PROCEDURE — 77030034154 HC SHR COAG HARM ACE J&J -F: Performed by: SURGERY

## 2019-09-27 PROCEDURE — 74011250636 HC RX REV CODE- 250/636

## 2019-09-27 PROCEDURE — 77030039425 HC BLD LARYNG TRULITE DISP TELE -A: Performed by: REGISTERED NURSE

## 2019-09-27 PROCEDURE — 77030008522 HC TBNG INSUF LAPRO STRY -B: Performed by: SURGERY

## 2019-09-27 PROCEDURE — 77030018836 HC SOL IRR NACL ICUM -A: Performed by: SURGERY

## 2019-09-27 PROCEDURE — 76210000020 HC REC RM PH II FIRST 0.5 HR: Performed by: SURGERY

## 2019-09-27 PROCEDURE — 77030019908 HC STETH ESOPH SIMS -A: Performed by: ANESTHESIOLOGY

## 2019-09-27 PROCEDURE — 77030029140 HC TRCR ENDOSC Z THRD SLV AMR -B: Performed by: SURGERY

## 2019-09-27 PROCEDURE — 77030000038 HC TIP SCIS LAPSCP SURI -B: Performed by: SURGERY

## 2019-09-27 PROCEDURE — 74011000250 HC RX REV CODE- 250: Performed by: SURGERY

## 2019-09-27 PROCEDURE — 77030008756 HC TU IRR SUC STRY -B: Performed by: SURGERY

## 2019-09-27 PROCEDURE — 77030016151 HC PROTCTR LNS DFOG COVD -B: Performed by: SURGERY

## 2019-09-27 PROCEDURE — 77030010513 HC APPL CLP LIG J&J -C: Performed by: SURGERY

## 2019-09-27 PROCEDURE — 76060000034 HC ANESTHESIA 1.5 TO 2 HR: Performed by: SURGERY

## 2019-09-27 PROCEDURE — 74011250636 HC RX REV CODE- 250/636: Performed by: ANESTHESIOLOGY

## 2019-09-27 PROCEDURE — 88304 TISSUE EXAM BY PATHOLOGIST: CPT

## 2019-09-27 PROCEDURE — 74011000250 HC RX REV CODE- 250: Performed by: ANESTHESIOLOGY

## 2019-09-27 PROCEDURE — 76210000006 HC OR PH I REC 0.5 TO 1 HR: Performed by: SURGERY

## 2019-09-27 PROCEDURE — 77030040361 HC SLV COMPR DVT MDII -B: Performed by: SURGERY

## 2019-09-27 PROCEDURE — 76010000149 HC OR TIME 1 TO 1.5 HR: Performed by: SURGERY

## 2019-09-27 PROCEDURE — 74011250637 HC RX REV CODE- 250/637: Performed by: ANESTHESIOLOGY

## 2019-09-27 PROCEDURE — 77030007955 HC PCH ENDOSC SPEC J&J -B: Performed by: SURGERY

## 2019-09-27 PROCEDURE — 77030031139 HC SUT VCRL2 J&J -A: Performed by: SURGERY

## 2019-09-27 PROCEDURE — 77030037088 HC TUBE ENDOTRACH ORAL NSL COVD-A: Performed by: REGISTERED NURSE

## 2019-09-27 PROCEDURE — 74011000250 HC RX REV CODE- 250

## 2019-09-27 PROCEDURE — 77030014008 HC SPNG HEMSTAT J&J -C: Performed by: SURGERY

## 2019-09-27 PROCEDURE — 77030012770 HC TRCR OPT FX AMR -B: Performed by: SURGERY

## 2019-09-27 RX ORDER — PROMETHAZINE HYDROCHLORIDE 25 MG/ML
INJECTION, SOLUTION INTRAMUSCULAR; INTRAVENOUS
Status: DISCONTINUED
Start: 2019-09-27 | End: 2019-09-27 | Stop reason: HOSPADM

## 2019-09-27 RX ORDER — CEFAZOLIN SODIUM/WATER 2 G/20 ML
2 SYRINGE (ML) INTRAVENOUS
Status: COMPLETED | OUTPATIENT
Start: 2019-09-27 | End: 2019-09-27

## 2019-09-27 RX ORDER — GLYCOPYRROLATE 0.2 MG/ML
INJECTION INTRAMUSCULAR; INTRAVENOUS AS NEEDED
Status: DISCONTINUED | OUTPATIENT
Start: 2019-09-27 | End: 2019-09-27 | Stop reason: HOSPADM

## 2019-09-27 RX ORDER — ACETAMINOPHEN 500 MG
1000 TABLET ORAL ONCE
Status: COMPLETED | OUTPATIENT
Start: 2019-09-27 | End: 2019-09-27

## 2019-09-27 RX ORDER — BUPIVACAINE HYDROCHLORIDE AND EPINEPHRINE 5; 5 MG/ML; UG/ML
INJECTION, SOLUTION EPIDURAL; INTRACAUDAL; PERINEURAL AS NEEDED
Status: DISCONTINUED | OUTPATIENT
Start: 2019-09-27 | End: 2019-09-27 | Stop reason: HOSPADM

## 2019-09-27 RX ORDER — ALBUTEROL SULFATE 0.83 MG/ML
2.5 SOLUTION RESPIRATORY (INHALATION) AS NEEDED
Status: DISCONTINUED | OUTPATIENT
Start: 2019-09-27 | End: 2019-09-27 | Stop reason: HOSPADM

## 2019-09-27 RX ORDER — MIDAZOLAM HYDROCHLORIDE 1 MG/ML
2 INJECTION, SOLUTION INTRAMUSCULAR; INTRAVENOUS
Status: DISCONTINUED | OUTPATIENT
Start: 2019-09-27 | End: 2019-09-27 | Stop reason: HOSPADM

## 2019-09-27 RX ORDER — EPHEDRINE SULFATE 50 MG/ML
INJECTION, SOLUTION INTRAVENOUS AS NEEDED
Status: DISCONTINUED | OUTPATIENT
Start: 2019-09-27 | End: 2019-09-27 | Stop reason: HOSPADM

## 2019-09-27 RX ORDER — FENTANYL CITRATE 50 UG/ML
INJECTION, SOLUTION INTRAMUSCULAR; INTRAVENOUS AS NEEDED
Status: DISCONTINUED | OUTPATIENT
Start: 2019-09-27 | End: 2019-09-27 | Stop reason: HOSPADM

## 2019-09-27 RX ORDER — DEXAMETHASONE SODIUM PHOSPHATE 4 MG/ML
INJECTION, SOLUTION INTRA-ARTICULAR; INTRALESIONAL; INTRAMUSCULAR; INTRAVENOUS; SOFT TISSUE AS NEEDED
Status: DISCONTINUED | OUTPATIENT
Start: 2019-09-27 | End: 2019-09-27 | Stop reason: HOSPADM

## 2019-09-27 RX ORDER — ROCURONIUM BROMIDE 10 MG/ML
INJECTION, SOLUTION INTRAVENOUS AS NEEDED
Status: DISCONTINUED | OUTPATIENT
Start: 2019-09-27 | End: 2019-09-27 | Stop reason: HOSPADM

## 2019-09-27 RX ORDER — ONDANSETRON 2 MG/ML
4 INJECTION INTRAMUSCULAR; INTRAVENOUS
Status: DISCONTINUED | OUTPATIENT
Start: 2019-09-27 | End: 2019-09-27 | Stop reason: HOSPADM

## 2019-09-27 RX ORDER — HYDROCODONE BITARTRATE AND ACETAMINOPHEN 5; 325 MG/1; MG/1
TABLET ORAL
Qty: 20 TAB | Refills: 0 | Status: SHIPPED | OUTPATIENT
Start: 2019-09-27 | End: 2019-09-27

## 2019-09-27 RX ORDER — OXYCODONE HYDROCHLORIDE 5 MG/1
5-15 TABLET ORAL
Qty: 40 TAB | Refills: 0 | Status: SHIPPED | OUTPATIENT
Start: 2019-09-27 | End: 2019-10-02

## 2019-09-27 RX ORDER — ONDANSETRON 2 MG/ML
INJECTION INTRAMUSCULAR; INTRAVENOUS AS NEEDED
Status: DISCONTINUED | OUTPATIENT
Start: 2019-09-27 | End: 2019-09-27 | Stop reason: HOSPADM

## 2019-09-27 RX ORDER — NEOSTIGMINE METHYLSULFATE 1 MG/ML
INJECTION, SOLUTION INTRAVENOUS AS NEEDED
Status: DISCONTINUED | OUTPATIENT
Start: 2019-09-27 | End: 2019-09-27 | Stop reason: HOSPADM

## 2019-09-27 RX ORDER — ONDANSETRON HYDROCHLORIDE 8 MG/1
8 TABLET, FILM COATED ORAL
Qty: 10 TAB | Refills: 0 | Status: SHIPPED | OUTPATIENT
Start: 2019-09-27

## 2019-09-27 RX ORDER — HYDROMORPHONE HYDROCHLORIDE 2 MG/ML
0.5 INJECTION, SOLUTION INTRAMUSCULAR; INTRAVENOUS; SUBCUTANEOUS
Status: DISCONTINUED | OUTPATIENT
Start: 2019-09-27 | End: 2019-09-27 | Stop reason: HOSPADM

## 2019-09-27 RX ORDER — PROPOFOL 10 MG/ML
INJECTION, EMULSION INTRAVENOUS AS NEEDED
Status: DISCONTINUED | OUTPATIENT
Start: 2019-09-27 | End: 2019-09-27 | Stop reason: HOSPADM

## 2019-09-27 RX ORDER — SODIUM CHLORIDE, SODIUM LACTATE, POTASSIUM CHLORIDE, CALCIUM CHLORIDE 600; 310; 30; 20 MG/100ML; MG/100ML; MG/100ML; MG/100ML
1000 INJECTION, SOLUTION INTRAVENOUS CONTINUOUS
Status: DISCONTINUED | OUTPATIENT
Start: 2019-09-27 | End: 2019-09-27 | Stop reason: HOSPADM

## 2019-09-27 RX ORDER — LIDOCAINE HYDROCHLORIDE 10 MG/ML
0.1 INJECTION INFILTRATION; PERINEURAL AS NEEDED
Status: DISCONTINUED | OUTPATIENT
Start: 2019-09-27 | End: 2019-09-27 | Stop reason: HOSPADM

## 2019-09-27 RX ORDER — LIDOCAINE HYDROCHLORIDE 20 MG/ML
INJECTION, SOLUTION EPIDURAL; INFILTRATION; INTRACAUDAL; PERINEURAL AS NEEDED
Status: DISCONTINUED | OUTPATIENT
Start: 2019-09-27 | End: 2019-09-27 | Stop reason: HOSPADM

## 2019-09-27 RX ADMIN — Medication 2 G: at 12:41

## 2019-09-27 RX ADMIN — HYDROMORPHONE HYDROCHLORIDE 0.5 MG: 2 INJECTION INTRAMUSCULAR; INTRAVENOUS; SUBCUTANEOUS at 15:46

## 2019-09-27 RX ADMIN — EPHEDRINE SULFATE 10 MG: 50 INJECTION, SOLUTION INTRAVENOUS at 14:09

## 2019-09-27 RX ADMIN — GLYCOPYRROLATE 0.3 MG: 0.2 INJECTION INTRAMUSCULAR; INTRAVENOUS at 15:16

## 2019-09-27 RX ADMIN — PROPOFOL 150 MG: 10 INJECTION, EMULSION INTRAVENOUS at 14:06

## 2019-09-27 RX ADMIN — DEXAMETHASONE SODIUM PHOSPHATE 4 MG: 4 INJECTION, SOLUTION INTRA-ARTICULAR; INTRALESIONAL; INTRAMUSCULAR; INTRAVENOUS; SOFT TISSUE at 14:50

## 2019-09-27 RX ADMIN — NEOSTIGMINE METHYLSULFATE 3 MG: 1 INJECTION, SOLUTION INTRAVENOUS at 15:16

## 2019-09-27 RX ADMIN — ROCURONIUM BROMIDE 35 MG: 10 INJECTION, SOLUTION INTRAVENOUS at 14:10

## 2019-09-27 RX ADMIN — LIDOCAINE HYDROCHLORIDE 60 MG: 20 INJECTION, SOLUTION EPIDURAL; INFILTRATION; INTRACAUDAL; PERINEURAL at 14:06

## 2019-09-27 RX ADMIN — ROCURONIUM BROMIDE 5 MG: 10 INJECTION, SOLUTION INTRAVENOUS at 15:02

## 2019-09-27 RX ADMIN — SODIUM CHLORIDE, SODIUM LACTATE, POTASSIUM CHLORIDE, AND CALCIUM CHLORIDE 1000 ML: 600; 310; 30; 20 INJECTION, SOLUTION INTRAVENOUS at 12:41

## 2019-09-27 RX ADMIN — FENTANYL CITRATE 50 MCG: 50 INJECTION, SOLUTION INTRAMUSCULAR; INTRAVENOUS at 14:06

## 2019-09-27 RX ADMIN — FENTANYL CITRATE 50 MCG: 50 INJECTION, SOLUTION INTRAMUSCULAR; INTRAVENOUS at 14:35

## 2019-09-27 RX ADMIN — FENTANYL CITRATE 25 MCG: 50 INJECTION, SOLUTION INTRAMUSCULAR; INTRAVENOUS at 14:56

## 2019-09-27 RX ADMIN — HYDROMORPHONE HYDROCHLORIDE 0.5 MG: 2 INJECTION INTRAMUSCULAR; INTRAVENOUS; SUBCUTANEOUS at 15:41

## 2019-09-27 RX ADMIN — ONDANSETRON 4 MG: 2 INJECTION INTRAMUSCULAR; INTRAVENOUS at 14:50

## 2019-09-27 RX ADMIN — PROMETHAZINE HYDROCHLORIDE 3.25 MG: 25 INJECTION INTRAMUSCULAR; INTRAVENOUS at 16:03

## 2019-09-27 RX ADMIN — PROPOFOL 30 MG: 10 INJECTION, EMULSION INTRAVENOUS at 14:10

## 2019-09-27 RX ADMIN — ROCURONIUM BROMIDE 5 MG: 10 INJECTION, SOLUTION INTRAVENOUS at 14:26

## 2019-09-27 RX ADMIN — GLYCOPYRROLATE 0.1 MG: 0.2 INJECTION INTRAMUSCULAR; INTRAVENOUS at 15:05

## 2019-09-27 RX ADMIN — ACETAMINOPHEN 1000 MG: 500 TABLET, FILM COATED ORAL at 12:41

## 2019-09-27 NOTE — INTERVAL H&P NOTE
H&P Update:  Madhav Ibrahim was seen and examined. History and physical has been reviewed. The patient has been examined.  There have been no significant clinical changes since the completion of the originally dated History and Physical.

## 2019-09-27 NOTE — DISCHARGE INSTRUCTIONS
Marcus Bull M.D.  [phone]    Instructions following Laparoscopic Cholecystectomy:    ACTIVITY:   Try to take a few short walks with help around the house later today. It is very important to take short walks to avoid blood clots and pneumonia.  You may be light-headed or sleepy from anesthesia, so be careful going up and down stairs.  Avoid any activity that may be dangerous or involves heavy objects for 24-48 hours. DIET:   Drink only clear, non-carbonated liquids when you get home (sugar-free if you are diabetic), such as Gatorade, chicken broth, etc.   Tomorrow start soft foods such as grits and eggs, mashed potatoes, yogurt, cottage cheese, etc. Remain on soft foods for at least 24-36 hours then you may eat whatever foods you wish.  Many people experience nausea for a day or so after surgery, and many people have loose stools or diarrhea immediately after gallbladder surgery. It is also not uncommon to not have a bowel movement for 2-3 days. PAIN:   You will be given a prescription for pain medication and nausea.  Try to take the pain medication with food, even a few crackers.  You may also use Tylenol, Motrin, Advil, or Aleve instead of the prescription pain medication. Do no take Tylenol and the prescription pain medication within 4 hours of each other.  URINARY RETENTION: If you are unable to empty your bladder within 6 hours after returning home, please go to your nearest Emergency Department or Urgent Care for urinary catheterization. WOUND CARE:   You may shower the day after surgery   It is not uncommon for the incisions to ooze or drain blood-tinged fluid. Cover the area with gauze if the drainage continues.  Incisions will sometimes develop redness around them, up to the size of a quarter, as well as a hard lumpy feel. If this redness continues to get larger, please call the office.     PLEASE NOTE: it is not uncommon to have pain and a \"knot\" to the left of the belly button. CALL THE DOCTOR IF:   You have a temperature higher than 101.5° Fahrenheit for more than 6 hours.  You have severe nausea or vomiting not relieved by medication; or diarrhea.  You develop increasing redness or infection at the incision. Continue home medications as previously prescribed. Valente Koroma Monday 9/30    After general anesthesia or intravenous sedation, for 24 hours or while taking prescription Narcotics:  · Limit your activities  · A responsible adult needs to be with you for the next 24 hours  · Do not drive and operate hazardous machinery  · Do not make important personal or business decisions  · Do  not drink alcoholic beverages  · If you have not urinated within 8 hours after discharge, please contact your surgeon on call. · If you have sleep apnea and have a CPAP machine, please use it for all naps and sleeping. · Please use caution when taking narcotics and any of your home medications that may cause drowsiness. *  Please give a list of your current medications to your Primary Care Provider. *  Please update this list whenever your medications are discontinued, doses are      changed, or new medications (including over-the-counter products) are added. *  Please carry medication information at all times in case of emergency situations. These are general instructions for a healthy lifestyle:  No smoking/ No tobacco products/ Avoid exposure to second hand smoke  Surgeon General's Warning:  Quitting smoking now greatly reduces serious risk to your health.   Obesity, smoking, and sedentary lifestyle greatly increases your risk for illness  A healthy diet, regular physical exercise & weight monitoring are important for maintaining a healthy lifestyle    You may be retaining fluid if you have a history of heart failure or if you experience any of the following symptoms:  Weight gain of 3 pounds or more overnight or 5 pounds in a week, increased swelling in our hands or feet or shortness of breath while lying flat in bed. Please call your doctor as soon as you notice any of these symptoms; do not wait until your next office visit.

## 2019-09-27 NOTE — ANESTHESIA PREPROCEDURE EVALUATION
Relevant Problems   No relevant active problems       Anesthetic History   No history of anesthetic complications            Review of Systems / Medical History  Patient summary reviewed and pertinent labs reviewed    Pulmonary    COPD: moderate      Shortness of breath and smoker (1 ppd x 50 years)         Neuro/Psych   Within defined limits           Cardiovascular            Dysrhythmias : atrial fibrillation      Exercise tolerance: >4 METS  Comments: Denies CP, SOB or changes in functional status   GI/Hepatic/Renal     GERD: well controlled           Endo/Other  Within defined limits           Other Findings   Comments: Bile duct obstruction with elevated LFTs  Had A fib during first ERCP    NSR           Physical Exam    Airway  Mallampati: II  TM Distance: 4 - 6 cm  Neck ROM: normal range of motion   Mouth opening: Normal     Cardiovascular  Regular rate and rhythm,  S1 and S2 normal,  no murmur, click, rub, or gallop  Rhythm: regular  Rate: normal         Dental    Dentition: Bridges     Pulmonary  Breath sounds clear to auscultation               Abdominal  GI exam deferred       Other Findings            Anesthetic Plan    ASA: 3  Anesthesia type: general          Induction: Intravenous  Anesthetic plan and risks discussed with: Patient      MAGGIE

## 2019-09-27 NOTE — ANESTHESIA POSTPROCEDURE EVALUATION
Procedure(s):  CHOLECYSTECTOMY LAPAROSCOPIC SINGLE INCISION (SILS). general    Anesthesia Post Evaluation      Multimodal analgesia: multimodal analgesia used between 6 hours prior to anesthesia start to PACU discharge  Patient location during evaluation: PACU  Patient participation: complete - patient participated  Level of consciousness: sleepy but conscious  Pain management: adequate  Airway patency: patent  Anesthetic complications: no  Cardiovascular status: acceptable  Respiratory status: acceptable  Hydration status: acceptable  Post anesthesia nausea and vomiting:  controlled      Vitals Value Taken Time   /67 9/27/2019  4:21 PM   Temp 36.8 °C (98.3 °F) 9/27/2019  4:06 PM   Pulse 59 9/27/2019  4:24 PM   Resp 16 9/27/2019  4:21 PM   SpO2 91 % 9/27/2019  4:24 PM   Vitals shown include unvalidated device data.

## 2019-09-27 NOTE — OP NOTES
Operative Report    Patient: Geo Chang MRN: [id number]      YOB: 1950  Age: 68 y.o. Sex: female       Date of Surgery: 9/27/2019     Preoperative Diagnosis: Gallstones [K80.20]     Postoperative Diagnosis: Gallstones [K80.20]  Chronic cholecystitis  Hydrops of gallbladder      Procedure:  Laparoscopic Cholecystectomy -Single Incision/SILS    Anesthesia: General   Complications: none  Estimated Blood Loss: per anesthesia    Indications:  As outlined in the History and Physical.  Single incision technique is planned to provide the patient with the benefit of less incisional pain and improved cosmesis due to fewer incisions as well as the potential for lower risk of wound infection. This technique is significantly more intricate than standard laparoscopic techniques and typically increases the complexity of the procedure by 10-20%. Procedure in Detail:   Informed consent was obtained and the patient was brought to the operating room and placed on the table in supine position with adequate padding of all pressure points and compression devices on both lower extremities. After the successful induction of general anesthesia, the patients abdomen was prepped and draped sterilely. Under direct laparoscopic visualization and additional local anesthetic, a 5mm optiview-type Trocar was inserted through a curved infraumbilical  incision and pneumoperitoneum was created. Visual exploration revealed no immediately apparent pathology other than an obviously distended gallbladder fundus. An additional 5mm Trocar was placed through the incision  and a [place] grasper was inserted alongside that trocar sleeve. The gallbladder was grasped by its fundus and elevated revealing diffuse, severe, chronic cholecystitis. The omental adhesions to the midportion were divided and followed to the neck area.   After aspirating the gallbladder of 25cc of hydropic fluid to improve mobility, attempt was made to divide the woody tissues investing the visibly normal caliber cystic duct but the critical view could not be obtained posterior to the duct. The dome down technique was used to remove the gallbladder from the liver bed, which was also difficult due to the lack of a defined tissue plane from the severe chronic inflammation. The artery was divided during this mobilization within its surrounding woody tissue. Eventually, the duct was freed from the surrounding tissues and exposed for approximately 10mm. Its path suggested a distal or low origin as suggested from previous ERCP reports. A clip was then placed across the neck of the gallbladder and two on the duct 0.5 cm medially and it was transected, completing the cholecystectomy. The liver bed was vigorously irrigated and spot-cauterized to aid hemostasis. A sheet of surgicel was placed in the gallbladder fossa. The gallbladder was placed in an endocatch which was extracted through the umbilical site. Pneumoperitoneum was released and the trocars were removed. The fascia was closed with a pair of sutures of figure-of-eight 0-Vicryl,  then the incision was  made hemostatic with cautery and closed with subcuticular 4-0 Vicryl and Steri-Strips were applied. The patient tolerated the procedure well. There were no immediate apparent complications. She was awakened from anesthesia and extubated in the operating room, taken to recovery in satisfactory condition. Specimens:   ID Type Source Tests Collected by Time Destination   1 : GALLBLADDER Preservative Gallbladder  Zuleyma Perry MD 9/27/2019 1437 Pathology           Counts: Sponge and needle counts were correct.     Signed By:  Fanta Nieves MD     September 27, 2019

## 2020-10-20 ENCOUNTER — TRANSCRIBE ORDER (OUTPATIENT)
Dept: SCHEDULING | Age: 70
End: 2020-10-20

## 2020-10-20 DIAGNOSIS — R16.0 LIVER MASS: Primary | ICD-10-CM

## 2020-10-22 ENCOUNTER — HOSPITAL ENCOUNTER (OUTPATIENT)
Dept: MRI IMAGING | Age: 70
Discharge: HOME | End: 2020-10-22
Attending: STUDENT IN AN ORGANIZED HEALTH CARE EDUCATION/TRAINING PROGRAM
Payer: MEDICARE

## 2020-10-22 DIAGNOSIS — R16.0 LIVER MASS: ICD-10-CM

## 2020-10-22 PROCEDURE — A9575 INJ GADOTERATE MEGLUMI 0.1ML: HCPCS | Performed by: STUDENT IN AN ORGANIZED HEALTH CARE EDUCATION/TRAINING PROGRAM

## 2020-10-22 PROCEDURE — 74011250636 HC RX REV CODE- 250/636: Performed by: STUDENT IN AN ORGANIZED HEALTH CARE EDUCATION/TRAINING PROGRAM

## 2020-10-22 PROCEDURE — 74183 MRI ABD W/O CNTR FLWD CNTR: CPT

## 2020-10-22 PROCEDURE — 74011000258 HC RX REV CODE- 258: Performed by: STUDENT IN AN ORGANIZED HEALTH CARE EDUCATION/TRAINING PROGRAM

## 2020-10-22 RX ORDER — GADOTERATE MEGLUMINE 376.9 MG/ML
12 INJECTION INTRAVENOUS
Status: COMPLETED | OUTPATIENT
Start: 2020-10-22 | End: 2020-10-22

## 2020-10-22 RX ORDER — SODIUM CHLORIDE 0.9 % (FLUSH) 0.9 %
10 SYRINGE (ML) INJECTION
Status: COMPLETED | OUTPATIENT
Start: 2020-10-22 | End: 2020-10-22

## 2020-10-22 RX ADMIN — SODIUM CHLORIDE 100 ML: 900 INJECTION, SOLUTION INTRAVENOUS at 10:12

## 2020-10-22 RX ADMIN — Medication 10 ML: at 10:11

## 2020-10-22 RX ADMIN — GADOTERATE MEGLUMINE 12 ML: 376.9 INJECTION INTRAVENOUS at 10:11

## 2021-04-14 ENCOUNTER — TRANSCRIBE ORDER (OUTPATIENT)
Dept: SCHEDULING | Age: 71
End: 2021-04-14

## 2021-04-14 DIAGNOSIS — Z80.0 FAMILY HISTORY OF MALIGNANT NEOPLASM OF DIGESTIVE ORGAN: ICD-10-CM

## 2021-04-14 DIAGNOSIS — R16.0 HEPATOMEGALY, NOT ELSEWHERE CLASSIFIED: Primary | ICD-10-CM

## 2021-04-14 DIAGNOSIS — R10.11 RIGHT UPPER QUADRANT PAIN: ICD-10-CM

## 2021-04-20 ENCOUNTER — HOSPITAL ENCOUNTER (OUTPATIENT)
Dept: CT IMAGING | Age: 71
Discharge: HOME | End: 2021-04-20
Attending: STUDENT IN AN ORGANIZED HEALTH CARE EDUCATION/TRAINING PROGRAM
Payer: MEDICARE

## 2021-04-20 DIAGNOSIS — R10.11 RIGHT UPPER QUADRANT PAIN: ICD-10-CM

## 2021-04-20 DIAGNOSIS — Z80.0 FAMILY HISTORY OF MALIGNANT NEOPLASM OF DIGESTIVE ORGAN: ICD-10-CM

## 2021-04-20 DIAGNOSIS — R16.0 HEPATOMEGALY, NOT ELSEWHERE CLASSIFIED: ICD-10-CM

## 2021-04-20 LAB — CREAT BLD-MCNC: 0.9 MG/DL (ref 0.8–1.5)

## 2021-04-20 PROCEDURE — 74011000258 HC RX REV CODE- 258: Performed by: STUDENT IN AN ORGANIZED HEALTH CARE EDUCATION/TRAINING PROGRAM

## 2021-04-20 PROCEDURE — 82565 ASSAY OF CREATININE: CPT

## 2021-04-20 PROCEDURE — 74160 CT ABDOMEN W/CONTRAST: CPT

## 2021-04-20 PROCEDURE — 74011000636 HC RX REV CODE- 636: Performed by: STUDENT IN AN ORGANIZED HEALTH CARE EDUCATION/TRAINING PROGRAM

## 2021-04-20 RX ORDER — SODIUM CHLORIDE 0.9 % (FLUSH) 0.9 %
10 SYRINGE (ML) INJECTION
Status: COMPLETED | OUTPATIENT
Start: 2021-04-20 | End: 2021-04-20

## 2021-04-20 RX ADMIN — Medication 10 ML: at 12:24

## 2021-04-20 RX ADMIN — IOPAMIDOL 100 ML: 755 INJECTION, SOLUTION INTRAVENOUS at 12:24

## 2021-04-20 RX ADMIN — SODIUM CHLORIDE 100 ML: 900 INJECTION, SOLUTION INTRAVENOUS at 12:24

## 2021-05-27 NOTE — ANESTHESIA POSTPROCEDURE EVALUATION
Procedure(s):  ENDOSCOPIC RETROGRADE CHOLANGIOPANCREATOGRAPHY (ERCP)  ENDOSCOPY WITH PROSTHESIS OR STENT REMOVAL  ENDOSCOPIC STONE EXTRACTION/BALLOON SWEEP  ENDOSCOPY WITH PROSTHESIS OR STENT PLACEMENT. general    Anesthesia Post Evaluation      Multimodal analgesia: multimodal analgesia used between 6 hours prior to anesthesia start to PACU discharge  Patient location during evaluation: bedside  Patient participation: complete - patient participated  Level of consciousness: awake and alert  Pain management: adequate  Airway patency: patent  Anesthetic complications: no  Cardiovascular status: hemodynamically stable  Respiratory status: spontaneous ventilation  Hydration status: euvolemic  Comments: Patient stable and may discharge at this time. Vitals Value Taken Time   BP 96/52 9/7/2019  3:55 PM   Temp 36.9 °C (98.4 °F) 9/7/2019  3:36 PM   Pulse 83 9/7/2019  3:57 PM   Resp 30 9/7/2019  3:52 PM   SpO2 96 % 9/7/2019  3:57 PM   Vitals shown include unvalidated device data.
neg.

## (undated) DEVICE — BAG SPEC REM 224ML W4XL6IN DIA10MM 1 HND GYN DISP ENDOPCH

## (undated) DEVICE — NDL PRT INJ NSAF BLNT 18GX1.5 --

## (undated) DEVICE — GUIDEWIRE ENDOSCP L260CM DIA0.035IN STD BILI HYDRPHLC EXT

## (undated) DEVICE — 2, DISPOSABLE SUCTION/IRRIGATOR WITHOUT DISPOSABLE TIP: Brand: STRYKEFLOW

## (undated) DEVICE — REM POLYHESIVE ADULT PATIENT RETURN ELECTRODE: Brand: VALLEYLAB

## (undated) DEVICE — GARMENT,MEDLINE,DVT,INT,CALF,MED, GEN2: Brand: MEDLINE

## (undated) DEVICE — LAP CHOLE: Brand: MEDLINE INDUSTRIES, INC.

## (undated) DEVICE — ADULT SPO2 SENSOR: Brand: NELLCOR

## (undated) DEVICE — (D)PREP SKN CHLRAPRP APPL 26ML -- CONVERT TO ITEM 371833

## (undated) DEVICE — WIREGUIDED RETRIEVAL BASKET: Brand: TRAPEZOID RX

## (undated) DEVICE — SYR 3ML LL TIP 1/10ML GRAD --

## (undated) DEVICE — KENDALL RADIOLUCENT FOAM MONITORING ELECTRODE RECTANGULAR SHAPE: Brand: KENDALL

## (undated) DEVICE — CANNULA NSL ORAL AD FOR CAPNOFLEX CO2 O2 AIRLFE

## (undated) DEVICE — DEVICE LCK BILI RAP EXCHG OLPS --

## (undated) DEVICE — CONTAINER PREFIL FRMLN 40ML --

## (undated) DEVICE — (D)SYR 10ML 1/5ML GRAD NSAF -- PKGING CHANGE USE ITEM 338027

## (undated) DEVICE — KENDALL SCD EXPRESS SLEEVES, KNEE LENGTH, MEDIUM: Brand: KENDALL SCD

## (undated) DEVICE — (D)STRIP SKN CLSR 0.5X4IN WHT --

## (undated) DEVICE — CONNECTOR TBNG OD5-7MM O2 END DISP

## (undated) DEVICE — TROCAR SLEEVE: Brand: KII ® LOW PROFILE SLEEVE

## (undated) DEVICE — BALLOON DILATATION CATHETER: Brand: HURRICANE™ RX

## (undated) DEVICE — SYR 5ML 1/5 GRAD LL NSAF LF --

## (undated) DEVICE — BLOCK BITE AD 60FR W/ VELC STRP ADDRESSES MOST PT AND

## (undated) DEVICE — RETRIEVAL BALLOON CATHETER: Brand: EXTRACTOR™ PRO RX

## (undated) DEVICE — CONTAINER SPEC FRMLN 120ML --

## (undated) DEVICE — TROCAR: Brand: KII FIOS FIRST ENTRY

## (undated) DEVICE — SOLUTION IV 1000ML 0.9% SOD CHL

## (undated) DEVICE — SOL ANTI-FOG 6ML MEDC -- MEDICHOICE - CONVERT TO 358427

## (undated) DEVICE — BUTTON SWITCH PENCIL BLADE ELECTRODE, HOLSTER: Brand: EDGE

## (undated) DEVICE — VISUALIZATION SYSTEM: Brand: CLEARIFY

## (undated) DEVICE — LOGICUT SCISSOR LENGTH 320MM: Brand: LOGI - LAPAROSCOPIC INSTRUMENT SYSTEM

## (undated) DEVICE — SYR 50ML LR LCK 1ML GRAD NSAF --

## (undated) DEVICE — AGENT HEMSTAT W2XL14IN OXIDIZED REGENERATED CELOS ABSRB FOR

## (undated) DEVICE — SNARE POLYP SM W13MMXL240CM SHTH DIA2.4MM OVL FLX DISP

## (undated) DEVICE — DRAPE TWL SURG 16X26IN BLU ORB04] ALLCARE INC]

## (undated) DEVICE — LAPAROSCOPIC TROCAR SLEEVE/SINGLE USE: Brand: KII® LOW PROFILE OPTICAL ACCESS SYSTEM

## (undated) DEVICE — FCPS BX HOT RJ4 2.2MMX240CM -- RADIAL JAW 4 BX/40

## (undated) DEVICE — TROCAR: Brand: KII® SLEEVE

## (undated) DEVICE — SHEARS ENDOSCP L36CM DIA5MM ULTRASONIC CRV TIP W/ ADV

## (undated) DEVICE — HYPODERMIC SAFETY NEEDLE: Brand: MAGELLAN

## (undated) DEVICE — SPHINCTEROTOME: Brand: JAGTOME RX 39

## (undated) DEVICE — MASTISOL ADHESIVE LIQ 2/3ML

## (undated) DEVICE — APPLIER CLP M/L SHFT DIA5MM 15 LIG LIGAMAX 5

## (undated) DEVICE — SUTURE VCRL SZ 0 L27IN ABSRB VLT L26MM CT-2 1/2 CIR J334H

## (undated) DEVICE — [HIGH FLOW INSUFFLATOR,  DO NOT USE IF PACKAGE IS DAMAGED,  KEEP DRY,  KEEP AWAY FROM SUNLIGHT,  PROTECT FROM HEAT AND RADIOACTIVE SOURCES.]: Brand: PNEUMOSURE

## (undated) DEVICE — SUTURE VCRL SZ 4-0 L27IN ABSRB UD L19MM PS-2 3/8 CIR PRIM J426H